# Patient Record
Sex: FEMALE | Race: WHITE | NOT HISPANIC OR LATINO | Employment: FULL TIME | URBAN - METROPOLITAN AREA
[De-identification: names, ages, dates, MRNs, and addresses within clinical notes are randomized per-mention and may not be internally consistent; named-entity substitution may affect disease eponyms.]

---

## 2023-03-08 DIAGNOSIS — E78.5 HYPERLIPIDEMIA, UNSPECIFIED HYPERLIPIDEMIA TYPE: Primary | ICD-10-CM

## 2023-03-08 RX ORDER — DENOSUMAB 60 MG/ML
INJECTION SUBCUTANEOUS
COMMUNITY
Start: 2020-12-22 | End: 2024-03-14 | Stop reason: WASHOUT

## 2023-03-08 RX ORDER — ATORVASTATIN CALCIUM 10 MG/1
10 TABLET, FILM COATED ORAL DAILY
Qty: 90 TABLET | Refills: 3 | Status: SHIPPED | OUTPATIENT
Start: 2023-03-08 | End: 2023-09-26 | Stop reason: ALTCHOICE

## 2023-03-08 RX ORDER — FLUOCINOLONE ACETONIDE 0.11 MG/ML
OIL TOPICAL
COMMUNITY
Start: 2021-06-18

## 2023-03-08 RX ORDER — LISINOPRIL 5 MG/1
1 TABLET ORAL DAILY
COMMUNITY
Start: 2017-08-04 | End: 2024-05-03 | Stop reason: SDUPTHER

## 2023-03-08 RX ORDER — MINERAL OIL
1 ENEMA (ML) RECTAL DAILY PRN
COMMUNITY

## 2023-03-08 RX ORDER — LEVOTHYROXINE SODIUM 100 UG/1
1 TABLET ORAL DAILY
COMMUNITY
Start: 2014-01-27 | End: 2023-05-17 | Stop reason: SDUPTHER

## 2023-03-08 RX ORDER — CARVEDILOL 3.12 MG/1
1 TABLET ORAL
COMMUNITY
Start: 2015-12-18 | End: 2023-07-20 | Stop reason: SDUPTHER

## 2023-03-08 RX ORDER — ATORVASTATIN CALCIUM 10 MG/1
1 TABLET, FILM COATED ORAL DAILY
COMMUNITY
Start: 2015-12-18 | End: 2023-03-08 | Stop reason: SDUPTHER

## 2023-03-29 LAB
ANION GAP IN SER/PLAS: 9 MMOL/L (ref 10–20)
CALCIUM (MG/DL) IN SER/PLAS: 9.3 MG/DL (ref 8.6–10.3)
CARBON DIOXIDE, TOTAL (MMOL/L) IN SER/PLAS: 31 MMOL/L (ref 21–32)
CHLORIDE (MMOL/L) IN SER/PLAS: 106 MMOL/L (ref 98–107)
CREATININE (MG/DL) IN SER/PLAS: 0.58 MG/DL (ref 0.5–1.05)
GFR FEMALE: >90 ML/MIN/1.73M2
GLUCOSE (MG/DL) IN SER/PLAS: 75 MG/DL (ref 74–99)
POTASSIUM (MMOL/L) IN SER/PLAS: 4.1 MMOL/L (ref 3.5–5.3)
SODIUM (MMOL/L) IN SER/PLAS: 142 MMOL/L (ref 136–145)
UREA NITROGEN (MG/DL) IN SER/PLAS: 14 MG/DL (ref 6–23)

## 2023-04-04 ENCOUNTER — OFFICE VISIT (OUTPATIENT)
Dept: PRIMARY CARE | Facility: CLINIC | Age: 68
End: 2023-04-04
Payer: COMMERCIAL

## 2023-04-04 VITALS
HEART RATE: 68 BPM | WEIGHT: 130.8 LBS | HEIGHT: 62 IN | DIASTOLIC BLOOD PRESSURE: 70 MMHG | BODY MASS INDEX: 24.07 KG/M2 | SYSTOLIC BLOOD PRESSURE: 106 MMHG | OXYGEN SATURATION: 97 %

## 2023-04-04 DIAGNOSIS — S76.012A MUSCLE STRAIN OF LEFT GLUTEAL REGION, INITIAL ENCOUNTER: Primary | ICD-10-CM

## 2023-04-04 PROCEDURE — 1036F TOBACCO NON-USER: CPT | Performed by: NURSE PRACTITIONER

## 2023-04-04 PROCEDURE — 1159F MED LIST DOCD IN RCRD: CPT | Performed by: NURSE PRACTITIONER

## 2023-04-04 PROCEDURE — 99213 OFFICE O/P EST LOW 20 MIN: CPT | Performed by: NURSE PRACTITIONER

## 2023-04-04 RX ORDER — TRIAMCINOLONE ACETONIDE 1 MG/G
CREAM TOPICAL
COMMUNITY
Start: 2023-04-01 | End: 2023-07-20 | Stop reason: SDUPTHER

## 2023-04-04 RX ORDER — FLUTICASONE PROPIONATE 50 MCG
2 SPRAY, SUSPENSION (ML) NASAL DAILY
COMMUNITY
Start: 2022-02-12

## 2023-04-04 ASSESSMENT — ENCOUNTER SYMPTOMS
CARDIOVASCULAR NEGATIVE: 1
BACK PAIN: 0
MYALGIAS: 1
RESPIRATORY NEGATIVE: 1
JOINT SWELLING: 0
ARTHRALGIAS: 0
GASTROINTESTINAL NEGATIVE: 1

## 2023-04-04 NOTE — PROGRESS NOTES
"Subjective   Patient ID: Annabel Bejarano is a 68 y.o. female who presents for Leg Pain (LLE x 1 week progressing).    HPI   Patient of Dr. aBrreto here today for acute concern. 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    Current Concern:  1) Left lower leg - specifically the left buttock pain for one week.  While doing yoga may have overstretched muscle.   Tried heat and stretches. Naproxen as needed. Interested in physical therapy    Chronic concern: Essential Hypertension, Hypothyroidism, Leukopenia, Lipid Disorder, Osteoporosis,   Specialist  Labs -03/29/2023      Review of Systems   Respiratory: Negative.     Cardiovascular: Negative.    Gastrointestinal: Negative.    Musculoskeletal:  Positive for myalgias. Negative for arthralgias, back pain, gait problem and joint swelling.       Objective   /70 (BP Location: Right arm, Patient Position: Sitting, BP Cuff Size: Adult)   Pulse 68   Ht 1.575 m (5' 2\")   Wt 59.3 kg (130 lb 12.8 oz)   SpO2 97%   BMI 23.92 kg/m²     Physical Exam  Vitals reviewed.   Constitutional:       Appearance: Normal appearance.   Cardiovascular:      Rate and Rhythm: Normal rate and regular rhythm.      Pulses: Normal pulses.      Heart sounds: Normal heart sounds.   Pulmonary:      Effort: Pulmonary effort is normal.      Breath sounds: Normal breath sounds.   Musculoskeletal:      Cervical back: Normal range of motion.      Right hip: Normal.      Left hip: No lacerations. Normal range of motion. Normal strength.      Right upper leg: Normal.      Left upper leg: No swelling, edema, tenderness or bony tenderness.      Left knee: Normal.      Right lower leg: Normal.      Left lower leg: Normal.      Comments: Straight leg lift- no deficits.    Skin:     General: Skin is warm.      Findings: No rash.   Neurological:      Mental Status: She is alert.      Gait: Gait is intact.      Deep Tendon Reflexes:      Reflex Scores:       " Bicep reflexes are 2+ on the right side and 2+ on the left side.       Patellar reflexes are 2+ on the right side and 2+ on the left side.  Psychiatric:         Mood and Affect: Mood normal.         Behavior: Behavior normal.         Judgment: Judgment normal.       Assessment/Plan   Diagnoses and all orders for this visit:  Muscle strain of left gluteal region, initial encounter  -     Referral to Physical Therapy; Future  Continue with conservative treatment Rest, ice or heat, NSAIDs.  If symptoms worsen please call back to office.   Plan/ Follow Up per scheduled with Dr. Barreto.

## 2023-05-17 DIAGNOSIS — E03.9 HYPOTHYROIDISM, UNSPECIFIED TYPE: Primary | ICD-10-CM

## 2023-05-17 RX ORDER — LEVOTHYROXINE SODIUM 100 UG/1
100 TABLET ORAL DAILY
Qty: 90 TABLET | Refills: 3 | Status: SHIPPED | OUTPATIENT
Start: 2023-05-17 | End: 2023-05-17

## 2023-07-20 DIAGNOSIS — I10 PRIMARY HYPERTENSION: ICD-10-CM

## 2023-07-20 DIAGNOSIS — L30.8 OTHER ECZEMA: Primary | ICD-10-CM

## 2023-07-20 RX ORDER — TRIAMCINOLONE ACETONIDE 1 MG/G
CREAM TOPICAL
Qty: 30 G | Refills: 3 | Status: SHIPPED | OUTPATIENT
Start: 2023-07-20

## 2023-07-20 RX ORDER — CARVEDILOL 3.12 MG/1
3.12 TABLET ORAL
Qty: 180 TABLET | Refills: 0 | Status: SHIPPED | OUTPATIENT
Start: 2023-07-20 | End: 2023-10-16

## 2023-07-31 ENCOUNTER — TELEPHONE (OUTPATIENT)
Dept: PEDIATRICS | Facility: CLINIC | Age: 68
End: 2023-07-31
Payer: COMMERCIAL

## 2023-07-31 DIAGNOSIS — L73.9 FOLLICULITIS: Primary | ICD-10-CM

## 2023-07-31 RX ORDER — DOXYCYCLINE HYCLATE 100 MG
100 TABLET ORAL 2 TIMES DAILY
Qty: 30 TABLET | Refills: 0 | Status: SHIPPED | OUTPATIENT
Start: 2023-07-31 | End: 2023-08-14

## 2023-08-09 LAB — CALCIUM (MG/DL) IN SER/PLAS: 9.4 MG/DL (ref 8.6–10.6)

## 2023-09-26 PROBLEM — H43.813 VITREOUS DEGENERATION OF BOTH EYES: Status: ACTIVE | Noted: 2023-09-26

## 2023-09-26 PROBLEM — H43.813 PVD (POSTERIOR VITREOUS DETACHMENT), BOTH EYES: Status: ACTIVE | Noted: 2023-09-26

## 2023-09-26 PROBLEM — E03.9 HYPOTHYROIDISM: Status: ACTIVE | Noted: 2023-09-26

## 2023-09-26 PROBLEM — S05.8X9A: Status: ACTIVE | Noted: 2023-09-26

## 2023-09-26 PROBLEM — M54.16 LEFT LUMBAR RADICULOPATHY: Status: ACTIVE | Noted: 2023-09-26

## 2023-09-26 PROBLEM — I77.810 ASCENDING AORTA DILATATION (CMS-HCC): Status: ACTIVE | Noted: 2023-09-26

## 2023-09-26 PROBLEM — I34.0 NON-RHEUMATIC MITRAL REGURGITATION: Status: ACTIVE | Noted: 2023-09-26

## 2023-09-26 PROBLEM — H18.529 ABMD (ANTERIOR BASEMENT MEMBRANE DYSTROPHY): Status: ACTIVE | Noted: 2023-09-26

## 2023-09-26 PROBLEM — H04.123 BILATERAL DRY EYES: Status: ACTIVE | Noted: 2023-09-26

## 2023-09-26 PROBLEM — H01.003 BLEPHARITIS, BOTH EYES: Status: ACTIVE | Noted: 2023-09-26

## 2023-09-26 PROBLEM — E78.9 LIPID DISORDER: Status: ACTIVE | Noted: 2023-09-26

## 2023-09-26 PROBLEM — D72.819 LEUKOPENIA: Status: ACTIVE | Noted: 2023-09-26

## 2023-09-26 PROBLEM — Z96.1 PSEUDOPHAKIA OF BOTH EYES: Status: ACTIVE | Noted: 2023-09-26

## 2023-09-26 PROBLEM — H02.051: Status: ACTIVE | Noted: 2023-09-26

## 2023-09-26 PROBLEM — I10 ESSENTIAL HYPERTENSION: Status: ACTIVE | Noted: 2023-09-26

## 2023-09-26 PROBLEM — K64.5 HEMORRHOIDS, INTERNAL, THROMBOSED: Status: ACTIVE | Noted: 2023-09-26

## 2023-09-26 PROBLEM — H25.11 AGE-RELATED NUCLEAR CATARACT OF RIGHT EYE: Status: ACTIVE | Noted: 2023-09-26

## 2023-09-26 PROBLEM — I34.1 MVP (MITRAL VALVE PROLAPSE): Status: ACTIVE | Noted: 2023-09-26

## 2023-09-26 PROBLEM — N95.2 ATROPHIC VAGINITIS: Status: ACTIVE | Noted: 2023-09-26

## 2023-09-26 PROBLEM — M79.18 LEFT BUTTOCK PAIN: Status: ACTIVE | Noted: 2023-09-26

## 2023-09-26 PROBLEM — M81.0 OSTEOPOROSIS: Status: ACTIVE | Noted: 2023-09-26

## 2023-09-26 PROBLEM — H01.006 BLEPHARITIS, BOTH EYES: Status: ACTIVE | Noted: 2023-09-26

## 2023-09-26 PROBLEM — J31.0 RHINITIS: Status: ACTIVE | Noted: 2023-09-26

## 2023-09-26 PROBLEM — H25.12 AGE-RELATED NUCLEAR CATARACT OF LEFT EYE: Status: ACTIVE | Noted: 2023-09-26

## 2023-09-26 PROBLEM — R94.31 ABNORMAL EKG: Status: ACTIVE | Noted: 2023-09-26

## 2023-09-26 PROBLEM — M85.80 OSTEOPENIA: Status: ACTIVE | Noted: 2023-09-26

## 2023-09-26 PROBLEM — H52.10 MYOPIA: Status: ACTIVE | Noted: 2023-09-26

## 2023-09-26 PROBLEM — L73.9 FOLLICULITIS: Status: ACTIVE | Noted: 2023-09-26

## 2023-09-26 PROBLEM — D48.5 NEOPLASM OF UNCERTAIN BEHAVIOR OF SKIN: Status: ACTIVE | Noted: 2018-11-14

## 2023-09-26 PROBLEM — L30.9 ECZEMA: Status: ACTIVE | Noted: 2018-11-14

## 2023-09-26 PROBLEM — R92.8 ABNORMAL SCREENING MAMMOGRAM: Status: ACTIVE | Noted: 2023-09-26

## 2023-09-26 PROBLEM — M25.50 ARTHRALGIA: Status: ACTIVE | Noted: 2023-09-26

## 2023-09-26 PROBLEM — I35.1 MODERATE AORTIC REGURGITATION: Status: ACTIVE | Noted: 2023-09-26

## 2023-09-26 PROBLEM — D75.89 MACROCYTOSIS: Status: ACTIVE | Noted: 2023-09-26

## 2023-09-26 PROBLEM — J30.9 ALLERGIC RHINITIS: Status: ACTIVE | Noted: 2023-09-26

## 2023-09-26 PROBLEM — H17.11 CENTRAL CORNEAL OPACITY OF RIGHT EYE: Status: ACTIVE | Noted: 2023-09-26

## 2023-09-26 PROBLEM — L57.0 ACTINIC KERATOSIS: Status: ACTIVE | Noted: 2018-11-14

## 2023-09-26 PROBLEM — L90.0 LICHEN SCLEROSUS ET ATROPHICUS: Status: ACTIVE | Noted: 2023-09-26

## 2023-09-26 PROBLEM — H26.493 PCO (POSTERIOR CAPSULAR OPACIFICATION), BILATERAL: Status: ACTIVE | Noted: 2023-09-26

## 2023-09-26 PROBLEM — L98.9 SKIN LESION: Status: ACTIVE | Noted: 2023-09-26

## 2023-09-26 RX ORDER — ATORVASTATIN CALCIUM 20 MG/1
20 TABLET, FILM COATED ORAL DAILY
COMMUNITY
Start: 2023-05-26 | End: 2024-02-06 | Stop reason: SDUPTHER

## 2023-09-27 ENCOUNTER — OFFICE VISIT (OUTPATIENT)
Dept: PRIMARY CARE | Facility: CLINIC | Age: 68
End: 2023-09-27
Payer: COMMERCIAL

## 2023-09-27 VITALS
HEIGHT: 62 IN | TEMPERATURE: 97.1 F | HEART RATE: 70 BPM | WEIGHT: 126 LBS | DIASTOLIC BLOOD PRESSURE: 70 MMHG | SYSTOLIC BLOOD PRESSURE: 118 MMHG | BODY MASS INDEX: 23.19 KG/M2 | OXYGEN SATURATION: 98 %

## 2023-09-27 DIAGNOSIS — E03.9 HYPOTHYROIDISM, UNSPECIFIED TYPE: Primary | ICD-10-CM

## 2023-09-27 DIAGNOSIS — H91.90 HEARING LOSS, UNSPECIFIED HEARING LOSS TYPE, UNSPECIFIED LATERALITY: ICD-10-CM

## 2023-09-27 DIAGNOSIS — Z12.11 COLON CANCER SCREENING: ICD-10-CM

## 2023-09-27 DIAGNOSIS — I10 ESSENTIAL HYPERTENSION: ICD-10-CM

## 2023-09-27 DIAGNOSIS — Z00.00 WELLNESS EXAMINATION: ICD-10-CM

## 2023-09-27 DIAGNOSIS — M81.0 OSTEOPOROSIS, UNSPECIFIED OSTEOPOROSIS TYPE, UNSPECIFIED PATHOLOGICAL FRACTURE PRESENCE: ICD-10-CM

## 2023-09-27 DIAGNOSIS — M25.559 HIP PAIN, UNSPECIFIED LATERALITY: ICD-10-CM

## 2023-09-27 DIAGNOSIS — M54.9 BACK PAIN, UNSPECIFIED BACK LOCATION, UNSPECIFIED BACK PAIN LATERALITY, UNSPECIFIED CHRONICITY: ICD-10-CM

## 2023-09-27 PROCEDURE — 3078F DIAST BP <80 MM HG: CPT | Performed by: INTERNAL MEDICINE

## 2023-09-27 PROCEDURE — 1159F MED LIST DOCD IN RCRD: CPT | Performed by: INTERNAL MEDICINE

## 2023-09-27 PROCEDURE — 1126F AMNT PAIN NOTED NONE PRSNT: CPT | Performed by: INTERNAL MEDICINE

## 2023-09-27 PROCEDURE — 3074F SYST BP LT 130 MM HG: CPT | Performed by: INTERNAL MEDICINE

## 2023-09-27 PROCEDURE — 99397 PER PM REEVAL EST PAT 65+ YR: CPT | Performed by: INTERNAL MEDICINE

## 2023-09-27 PROCEDURE — 1036F TOBACCO NON-USER: CPT | Performed by: INTERNAL MEDICINE

## 2023-09-27 NOTE — PROGRESS NOTES
"Subjective   Patient ID: Annabel Bejarano is a 68 y.o. female who presents for Annual Exam.    HPI   Wellness exam  Feels generally well  Some stress w/ work  Pain for a number of weeks right sacroiliac region. No groin pain per se  Some mild decreased hearing    Review of Systems  Sees eye doctor twice yearly. Stable  Retiring next year    Objective   /70   Pulse 70   Temp 36.2 °C (97.1 °F)   Ht 1.575 m (5' 2\")   Wt 57.2 kg (126 lb)   SpO2 98%   BMI 23.05 kg/m²     Physical Exam  Gen nad, affect wnl  Heentt eomfg, face symmetric, ncat  Neck w/o la, tm, bruit  Lungs clear   Cv rrr nl s1, s2  Ext w/o edema  Neuro grossly nonfocal  Skin good color  Abd soft, nt, w/o hsm  Rom r hip normal w/o pain    Assessment/Plan   Diagnoses and all orders for this visit:  Hypothyroidism, unspecified type  Essential hypertension  Osteoporosis, unspecified osteoporosis type, unspecified pathological fracture presence  Hearing loss, unspecified hearing loss type, unspecified laterality  -     Referral to Audiology; Future  Back pain, unspecified back location, unspecified back pain laterality, unspecified chronicity  -     XR lumbar spine 2-3 views; Future  -     XR sacroiliac joints 3+ views; Future  -     XR hip right 2 or 3 views; Future  -     Urinalysis with Reflex Microscopic; Future  -     Referral to Physical Therapy; Future  Hip pain, unspecified laterality  -     XR lumbar spine 2-3 views; Future  -     XR sacroiliac joints 3+ views; Future  -     XR hip right 2 or 3 views; Future  -     Referral to Physical Therapy; Future  Colon cancer screening  -     Colonoscopy Screening; Future  Wellness examination  -     Urinalysis with Reflex Microscopic; Future  -     Comprehensive metabolic panel; Future  -     Lipid Panel; Future  -     Tsh With Reflex To Free T4 If Abnormal; Future  -     CBC and Auto Differential; Future  -     Vitamin D 25-Hydroxy,Total (for eval of Vitamin D levels); Future     " Audiology  Colonoscopy  Labs  Xrays  Phys therapy  If results ok and all goes well, follow up in a year

## 2023-09-29 ENCOUNTER — LAB (OUTPATIENT)
Dept: LAB | Facility: LAB | Age: 68
End: 2023-09-29
Payer: COMMERCIAL

## 2023-09-29 DIAGNOSIS — Z00.00 WELLNESS EXAMINATION: ICD-10-CM

## 2023-09-29 DIAGNOSIS — M54.9 BACK PAIN, UNSPECIFIED BACK LOCATION, UNSPECIFIED BACK PAIN LATERALITY, UNSPECIFIED CHRONICITY: ICD-10-CM

## 2023-09-29 LAB
ALANINE AMINOTRANSFERASE (SGPT) (U/L) IN SER/PLAS: 7 U/L (ref 7–45)
ALBUMIN (G/DL) IN SER/PLAS: 4.1 G/DL (ref 3.4–5)
ALKALINE PHOSPHATASE (U/L) IN SER/PLAS: 64 U/L (ref 33–136)
ANION GAP IN SER/PLAS: 14 MMOL/L (ref 10–20)
APPEARANCE, URINE: CLEAR
ASPARTATE AMINOTRANSFERASE (SGOT) (U/L) IN SER/PLAS: 21 U/L (ref 9–39)
BASOPHILS (10*3/UL) IN BLOOD BY AUTOMATED COUNT: 0.03 X10E9/L (ref 0–0.1)
BASOPHILS/100 LEUKOCYTES IN BLOOD BY AUTOMATED COUNT: 0.6 % (ref 0–2)
BILIRUBIN TOTAL (MG/DL) IN SER/PLAS: 0.5 MG/DL (ref 0–1.2)
BILIRUBIN, URINE: NEGATIVE
BLOOD, URINE: NEGATIVE
CALCIDIOL (25 OH VITAMIN D3) (NG/ML) IN SER/PLAS: 33 NG/ML
CALCIUM (MG/DL) IN SER/PLAS: 9.4 MG/DL (ref 8.6–10.6)
CARBON DIOXIDE, TOTAL (MMOL/L) IN SER/PLAS: 29 MMOL/L (ref 21–32)
CHLORIDE (MMOL/L) IN SER/PLAS: 104 MMOL/L (ref 98–107)
CHOLESTEROL (MG/DL) IN SER/PLAS: 151 MG/DL (ref 0–199)
CHOLESTEROL IN HDL (MG/DL) IN SER/PLAS: 69.9 MG/DL
CHOLESTEROL/HDL RATIO: 2.2
COLOR, URINE: ABNORMAL
CREATININE (MG/DL) IN SER/PLAS: 0.61 MG/DL (ref 0.5–1.05)
EOSINOPHILS (10*3/UL) IN BLOOD BY AUTOMATED COUNT: 0.27 X10E9/L (ref 0–0.7)
EOSINOPHILS/100 LEUKOCYTES IN BLOOD BY AUTOMATED COUNT: 5.6 % (ref 0–6)
ERYTHROCYTE DISTRIBUTION WIDTH (RATIO) BY AUTOMATED COUNT: 13.2 % (ref 11.5–14.5)
ERYTHROCYTE MEAN CORPUSCULAR HEMOGLOBIN CONCENTRATION (G/DL) BY AUTOMATED: 31.2 G/DL (ref 32–36)
ERYTHROCYTE MEAN CORPUSCULAR VOLUME (FL) BY AUTOMATED COUNT: 101 FL (ref 80–100)
ERYTHROCYTES (10*6/UL) IN BLOOD BY AUTOMATED COUNT: 4.09 X10E12/L (ref 4–5.2)
GFR FEMALE: >90 ML/MIN/1.73M2
GLUCOSE (MG/DL) IN SER/PLAS: 89 MG/DL (ref 74–99)
GLUCOSE, URINE: NEGATIVE MG/DL
HEMATOCRIT (%) IN BLOOD BY AUTOMATED COUNT: 41.3 % (ref 36–46)
HEMOGLOBIN (G/DL) IN BLOOD: 12.9 G/DL (ref 12–16)
IMMATURE GRANULOCYTES/100 LEUKOCYTES IN BLOOD BY AUTOMATED COUNT: 0.2 % (ref 0–0.9)
KETONES, URINE: NEGATIVE MG/DL
LDL: 67 MG/DL (ref 0–99)
LEUKOCYTE ESTERASE, URINE: ABNORMAL
LEUKOCYTES (10*3/UL) IN BLOOD BY AUTOMATED COUNT: 4.9 X10E9/L (ref 4.4–11.3)
LYMPHOCYTES (10*3/UL) IN BLOOD BY AUTOMATED COUNT: 1.53 X10E9/L (ref 1.2–4.8)
LYMPHOCYTES/100 LEUKOCYTES IN BLOOD BY AUTOMATED COUNT: 31.5 % (ref 13–44)
MONOCYTES (10*3/UL) IN BLOOD BY AUTOMATED COUNT: 0.51 X10E9/L (ref 0.1–1)
MONOCYTES/100 LEUKOCYTES IN BLOOD BY AUTOMATED COUNT: 10.5 % (ref 2–10)
MUCUS, URINE: ABNORMAL /LPF
NEUTROPHILS (10*3/UL) IN BLOOD BY AUTOMATED COUNT: 2.5 X10E9/L (ref 1.2–7.7)
NEUTROPHILS/100 LEUKOCYTES IN BLOOD BY AUTOMATED COUNT: 51.6 % (ref 40–80)
NITRITE, URINE: NEGATIVE
NRBC (PER 100 WBCS) BY AUTOMATED COUNT: 0 /100 WBC (ref 0–0)
PH, URINE: 7 (ref 5–8)
PLATELETS (10*3/UL) IN BLOOD AUTOMATED COUNT: 272 X10E9/L (ref 150–450)
POTASSIUM (MMOL/L) IN SER/PLAS: 4.6 MMOL/L (ref 3.5–5.3)
PROTEIN TOTAL: 6.7 G/DL (ref 6.4–8.2)
PROTEIN, URINE: NEGATIVE MG/DL
RBC, URINE: 1 /HPF (ref 0–5)
RENAL EPITHELIAL CELLS, URINE: <1 /HPF
SODIUM (MMOL/L) IN SER/PLAS: 142 MMOL/L (ref 136–145)
SPECIFIC GRAVITY, URINE: 1.01 (ref 1–1.03)
SQUAMOUS EPITHELIAL CELLS, URINE: <1 /HPF
THYROTROPIN (MIU/L) IN SER/PLAS BY DETECTION LIMIT <= 0.05 MIU/L: 0.29 MIU/L (ref 0.44–3.98)
THYROXINE (T4) FREE (NG/DL) IN SER/PLAS: 1.26 NG/DL (ref 0.78–1.48)
TRIGLYCERIDE (MG/DL) IN SER/PLAS: 69 MG/DL (ref 0–149)
UREA NITROGEN (MG/DL) IN SER/PLAS: 14 MG/DL (ref 6–23)
UROBILINOGEN, URINE: <2 MG/DL (ref 0–1.9)
VLDL: 14 MG/DL (ref 0–40)
WBC, URINE: 7 /HPF (ref 0–5)

## 2023-09-29 PROCEDURE — 84439 ASSAY OF FREE THYROXINE: CPT

## 2023-09-29 PROCEDURE — 85025 COMPLETE CBC W/AUTO DIFF WBC: CPT

## 2023-09-29 PROCEDURE — 80061 LIPID PANEL: CPT

## 2023-09-29 PROCEDURE — 84443 ASSAY THYROID STIM HORMONE: CPT

## 2023-09-29 PROCEDURE — 80053 COMPREHEN METABOLIC PANEL: CPT

## 2023-09-29 PROCEDURE — 81001 URINALYSIS AUTO W/SCOPE: CPT

## 2023-09-29 PROCEDURE — 82306 VITAMIN D 25 HYDROXY: CPT

## 2023-09-29 PROCEDURE — 36415 COLL VENOUS BLD VENIPUNCTURE: CPT

## 2023-10-14 DIAGNOSIS — I10 PRIMARY HYPERTENSION: ICD-10-CM

## 2023-10-14 DIAGNOSIS — L30.9 DERMATITIS: Primary | ICD-10-CM

## 2023-10-16 RX ORDER — FLUOCINOLONE ACETONIDE 0.11 MG/ML
OIL TOPICAL
Qty: 118.28 ML | Refills: 4 | Status: SHIPPED | OUTPATIENT
Start: 2023-10-16

## 2023-10-16 RX ORDER — CARVEDILOL 3.12 MG/1
3.12 TABLET ORAL
Qty: 180 TABLET | Refills: 0 | Status: SHIPPED | OUTPATIENT
Start: 2023-10-16 | End: 2024-01-15

## 2023-11-14 DIAGNOSIS — E03.9 HYPOTHYROIDISM, UNSPECIFIED TYPE: ICD-10-CM

## 2023-11-14 RX ORDER — LEVOTHYROXINE SODIUM 100 UG/1
100 TABLET ORAL DAILY
Qty: 90 TABLET | Refills: 3 | Status: SHIPPED | OUTPATIENT
Start: 2023-11-14 | End: 2024-11-13

## 2023-11-17 ENCOUNTER — CLINICAL SUPPORT (OUTPATIENT)
Dept: AUDIOLOGY | Facility: CLINIC | Age: 68
End: 2023-11-17
Payer: COMMERCIAL

## 2023-11-17 DIAGNOSIS — H90.41 SENSORINEURAL HEARING LOSS (SNHL) OF RIGHT EAR WITH UNRESTRICTED HEARING OF LEFT EAR: Primary | ICD-10-CM

## 2023-11-17 DIAGNOSIS — H91.90 HEARING LOSS, UNSPECIFIED HEARING LOSS TYPE, UNSPECIFIED LATERALITY: ICD-10-CM

## 2023-11-17 PROCEDURE — 92567 TYMPANOMETRY: CPT

## 2023-11-17 PROCEDURE — 92557 COMPREHENSIVE HEARING TEST: CPT

## 2023-11-17 ASSESSMENT — ENCOUNTER SYMPTOMS: OCCASIONAL FEELINGS OF UNSTEADINESS: 0

## 2023-11-17 NOTE — PROGRESS NOTES
AUDIOLOGY ADULT AUDIOMETRIC EVALUATION      Name:  Annabel Bejarano  :  1955  Age:  68 y.o.  Date of Evaluation:  2023    IMPRESSIONS     Today's test results are consistent with normal hearing sensitivity in the LE and normal hearing sensitivity steeply sloping to a moderately-severe SNHL at 8000 Hz in the RE. Discussed results and recommendations with patient.  Questions were addressed and the patient was encouraged to contact our department should concerns arise.    RECOMMENDATIONS     Given asymmetrical component at 8000 Hz, recommended to continue medical follow up with PCP/ENT. Patient agreed to this plan.  Return for annual hearing evaluation or sooner should concerns arise.    Time: 1493-7411    HISTORY     Reason for visit:  Annabel Bejarano is seen today at the request of Shukri Barreto MD for an evaluation of hearing.  Patient complains of difficulties understanding others in crowds or her  within the home. She notes he is a soft speaker at times. She also reported intermittent aural fullness which is resolved with Flonase. Patient denied history of tinnitus, dizziness, or excessive noise exposure. No history of hearing aid use.    TEST RESULTS     Otoscopic Evaluation:  Right Ear: Clear ear canal.  Left Ear: Clear ear canal.    Tympanometry & Acoustic Reflexes:  Right Ear: Middle ear pressure and eardrm mobility within defined limits. Unable to perform Acoustic Reflex Testing due to inability to maintain a hermetic seal.   Left Ear: Middle ear pressure and eardrm mobility within defined limits. Unable to perform Acoustic Reflex Testing due to inability to maintain a hermetic seal.     Pure Tone Audiometry:    Right Ear: Hearing sensitivity WNL from 125 Hz - 6000 Hz, steeply sloping to a moderately-severe SNHL at 8000 Hz only.  Left Ear: Hearing sensitivity WNL.     Speech Audiometry:   Right Ear:  Speech Reception Threshold (SRT) was obtained at 5 dBHL. Word Recognition scores  were excellent (100%) in quiet when words were presented at 55 dBHL.  Left Ear:  Speech Reception Threshold (SRT) was obtained at 5 dBHL. Word Recognition scores were excellent (100%) in quiet when words were presented at 55 dBHL.    Distortion Product Otoacoustic Emissions:  DNT.    Testing and interpretation of results completed LEONELA Amaral, CCC-A. It was my pleasure to evaluate this patient.       LEONELA Amaral, CCC-A  Licensed Audiologist      Degree of Hearing Sensitivity Decibel Range   Within Normal Limits (WNL) 0-25   Mild 26-40   Moderate 41-55   Moderately-Severe 56-70   Severe 71-90   Profound 91+      Key   CNT/DNT Could Not Test/Did Not Test   TM Tympanic Membrane   WNL Within Normal Limits   HA Hearing Aid   SNHL Sensorineural Hearing Loss   CHL Conductive Hearing Loss   NIHL Noise-Induced Hearing Loss   ECV Ear Canal Volume   RE/LE Right Ear/Left Ear        AUDIOGRAM

## 2023-12-01 ENCOUNTER — PHARMACY VISIT (OUTPATIENT)
Dept: PHARMACY | Facility: CLINIC | Age: 68
End: 2023-12-01
Payer: COMMERCIAL

## 2023-12-01 PROCEDURE — RXMED WILLOW AMBULATORY MEDICATION CHARGE

## 2023-12-05 ENCOUNTER — TELEPHONE (OUTPATIENT)
Dept: OBSTETRICS AND GYNECOLOGY | Facility: CLINIC | Age: 68
End: 2023-12-05
Payer: COMMERCIAL

## 2023-12-05 NOTE — TELEPHONE ENCOUNTER
Patient called stating that she is out of refills on her Prolia.     Specialty Pharmacy    March 5th is her check - up

## 2023-12-06 DIAGNOSIS — M81.0 AGE-RELATED OSTEOPOROSIS WITHOUT CURRENT PATHOLOGICAL FRACTURE: Primary | ICD-10-CM

## 2023-12-08 ENCOUNTER — ANCILLARY PROCEDURE (OUTPATIENT)
Dept: RADIOLOGY | Facility: CLINIC | Age: 68
End: 2023-12-08
Payer: COMMERCIAL

## 2023-12-08 VITALS — HEIGHT: 62 IN | BODY MASS INDEX: 23.21 KG/M2 | WEIGHT: 126.1 LBS

## 2023-12-08 PROCEDURE — 77067 SCR MAMMO BI INCL CAD: CPT | Mod: BILATERAL PROCEDURE | Performed by: RADIOLOGY

## 2023-12-08 PROCEDURE — 77063 BREAST TOMOSYNTHESIS BI: CPT | Mod: BILATERAL PROCEDURE | Performed by: RADIOLOGY

## 2023-12-08 PROCEDURE — 77063 BREAST TOMOSYNTHESIS BI: CPT

## 2023-12-12 ENCOUNTER — EVALUATION (OUTPATIENT)
Dept: PHYSICAL THERAPY | Facility: CLINIC | Age: 68
End: 2023-12-12
Payer: COMMERCIAL

## 2023-12-12 DIAGNOSIS — M54.9 BACK PAIN, UNSPECIFIED BACK LOCATION, UNSPECIFIED BACK PAIN LATERALITY, UNSPECIFIED CHRONICITY: Primary | ICD-10-CM

## 2023-12-12 DIAGNOSIS — M25.559 HIP PAIN, UNSPECIFIED LATERALITY: ICD-10-CM

## 2023-12-12 PROCEDURE — 97110 THERAPEUTIC EXERCISES: CPT | Mod: GP | Performed by: PHYSICAL THERAPIST

## 2023-12-12 PROCEDURE — 97161 PT EVAL LOW COMPLEX 20 MIN: CPT | Mod: GP | Performed by: PHYSICAL THERAPIST

## 2023-12-12 ASSESSMENT — ENCOUNTER SYMPTOMS
LOSS OF SENSATION IN FEET: 0
DEPRESSION: 0
OCCASIONAL FEELINGS OF UNSTEADINESS: 0

## 2023-12-12 ASSESSMENT — PATIENT HEALTH QUESTIONNAIRE - PHQ9
SUM OF ALL RESPONSES TO PHQ9 QUESTIONS 1 AND 2: 0
1. LITTLE INTEREST OR PLEASURE IN DOING THINGS: NOT AT ALL
2. FEELING DOWN, DEPRESSED OR HOPELESS: NOT AT ALL

## 2023-12-12 ASSESSMENT — PAIN SCALES - GENERAL: PAINLEVEL_OUTOF10: 2

## 2023-12-12 NOTE — PROGRESS NOTES
Physical Therapy    Physical Therapy Lumbar/LE Evaluation    Patient Name: Annabel Bejarano  MRN: [id number]  Today's Date: 12/12/2023  Time Calculation  Start Time: 0745  Stop Time: 0829  Time Calculation (min): 44 min    Current Problem  Problem List Items Addressed This Visit             ICD-10-CM    Back pain - Primary M54.9    Relevant Orders    Follow Up In Physical Therapy    Hip pain M25.559    Relevant Orders    Follow Up In Physical Therapy          Precautions  Precautions  Precautions Comment: None       Pain  Pain Score: 2  Pain at worst: 6/10  Location of pain: R hip that wraps laterally    SUBJECTIVE:   Chief complaint:  She reports that R hip pain, SI pain began about 3-4 mo ago.   Came about after doing yard work   Intermittent   Achy     PT note from 4/4/23:   Mechanism of Injury:. L LE symptoms in L buttock x 1 week. (3/28/23)  She overstretched doing Frederick stretch in yoga  She notes it also bothers her while touching toes  Symptoms radiate into lateral aspect of LLE into foot   She reports throbbing symptoms.   Denies N/T  Denies weakness   LBP every now and then   Also, the weekend prior to onset she was weeding, pushing wheel burrow. Questions if this added to her symptoms  Sometimes strain produces symptoms.     -N/T  -B/B  -Cough/sneeze/strain    Imaging:    FINDINGS:   There are  5 lumbar type vertebral bodies. There is grade 1   anterolisthesis of L3 on L4-L4 on L5 and L5 on S1. Vertebral body   height and alignment  are otherwise maintained. No fracture or   dislocation is evident.  There is severe L4-5 and L5-S1 and moderate   L3-4 facet degenerative change. There is mild-to-moderate L5-S1   discogenic degenerative change. Mild degenerative changes seen at   other levels of the visualized spine. Vascular calcifications are   seen over the soft tissues.     Multilevel spondylolisthesis and degenerative change of the spine as   above without osseous injury evident.     Aggravating  factors:  Rising from seated position, external rotation    Alleviating factors:  Laying in bed     Prior level of function:  Previously independent with all functional activity    Functional limitations:  Yard work, rising from seated position     Work:  Pediatrician. Part time     Patients goal:  Decrease pain     Prior tx:  PT earlier in the year     Objective:    Lower Extremity Strength: (WNL unless documented below)   MMT 5/5 max  RIGHT LEFT   Hip Flexion 5 5 reported R LBP   Hip Extension 4+ 4+   Hip Abduction 4+ pain SI  4+   Hip Adduction 4+ pain SI 4+   Hip IR  4 4   Hip ER 4+ 4+     Lower Extremity ROM: (WNL unless documented below)   ROM in Degrees  RIGHT LEFT   Hip Flexion     Hip Extension     Hip Abduction     Hip Adduction      Hip ER     Hip IR       Lower Extremity Flexibility: (WNL unless documented below)   Flexibility  RIGHT LEFT   Quad 9 in  8 in    Hamstring  WNL  WNL    Hip flexor  Min loss Min loss    Piriformis  Min loss  Min loss      Lumbar ROM:   Flexion WNL, mild pain R    Extension Min loss, pain 2/10    RIGHT LEFT   Side Bend Min loss, NE  Min loss, mild R pain      Patty Lumbar repeated movements:   Pretest Symptoms:    RFIS    ADAM Increase, 3/10   REIL  Decrease, better       Special tests: (WNL unless documented below)    RIGHT LEFT   Slump  - -   SLR - -   Dong + mild pain  -   SI tests  +thrust, -compression, gap      Myotomes: (WNL unless documented below)     Dermatomes: (WNL unless documented below)     Palpation: Non tender to palpation over SI     Outcome Measure:  Other Measures  Oswestry Disablity Index (VALARIE): 4%      TREATMENT:  Initial evaluation completed. Issued and reviewed HEP with pt that included:  Access Code: [email or web address]  URL: [email or web address]  Date: 12/12/2023  Prepared by: Isa Golden    Exercises  - Prone Press Up  - 7 x weekly - 10 reps - every 3-4 hours frequency   - Clamshell with Resistance  - 1 x daily - 7 x weekly - 2-3  sets - 10 reps  - Sidelying Reverse Clamshell with Resistance  - 1 x daily - 7 x weekly - 2-3 sets - 10 reps  - Supine Bridge with Resistance Band  - 1 x daily - 7 x weekly - 2-3 sets - 10 reps  - Prone Alternating Arm and Leg Lifts  - 1 x daily - 7 x weekly - 2-3 sets - 10 reps  - Supine Pelvic Tilt with Straight Leg Raise  - 1 x daily - 7 x weekly - 2 sets - 10 reps  - Seated Figure 4 Piriformis Stretch  - 1 x daily - 7 x weekly - 2 reps - 30 seconds hold  - Modified Zac Stretch  - 1 x daily - 7 x weekly - 2 reps - 30 seconds hold    ASSESSEMENT  The pt presents with signs and symptoms consistent with the Physical Therapy diagnosis of R LBP with possible SIJ involvement. She has pain that radiates laterally. Began anout 3-4 months ago after doing yard work.   Pt demonstrates decreased bilateral hip strength with some pain reported on R. 1/5 SI joint tests were positive today on the R. Relief with REIL. Good flexibility with some tightness in hip flexors. Pt highly motivated and will benefit from skilled physical therapy to reduce impairments in order to return to prior level of function, reduce pain, increase strength and ROM and improve overall posture.   The physical therapy prognosis is good for the patient to achieve their goals.   The pt tolerated therapy treatment today well with no adverse effects.  Barriers to therapy include:  None     PLAN  The pt will be seen 1 time(s) a week for 3-4 weeks.      The pt has been educated about the risks and benefits of physical therapy including manual therapy treatments and gives consent for treatment.     The patient will benefit from physical therapy treatment to include: therapeutic exercises, therapeutic activities, neurological re-education, manual therapy, modalities, and a home exercise program.       Goals:  Active       PT Problem       Reduce pain at worst to 2/10 with all functional and recreational activity.        Start:  12/12/23    Expected End:   03/11/24            Increase ROM/flexibility to WFL to perform daily functional activities       Start:  12/12/23    Expected End:  03/11/24            Increase by > or = 1/2 mm grade to improve stability and body mechanics to perform daily activities without increased pain/compensation         Start:  12/12/23    Expected End:  03/11/24            Pt to have decrease in Oswestry by 10% to display increased overall function.        Start:  12/12/23    Expected End:  03/11/24            Patient will demonstrate independence in home program for support of progression       Start:  12/12/23    Expected End:  03/11/24

## 2023-12-19 ENCOUNTER — APPOINTMENT (OUTPATIENT)
Dept: PHYSICAL THERAPY | Facility: CLINIC | Age: 68
End: 2023-12-19
Payer: COMMERCIAL

## 2024-01-09 ENCOUNTER — TREATMENT (OUTPATIENT)
Dept: PHYSICAL THERAPY | Facility: CLINIC | Age: 69
End: 2024-01-09
Payer: COMMERCIAL

## 2024-01-09 DIAGNOSIS — M54.9 BACK PAIN, UNSPECIFIED BACK LOCATION, UNSPECIFIED BACK PAIN LATERALITY, UNSPECIFIED CHRONICITY: Primary | ICD-10-CM

## 2024-01-09 DIAGNOSIS — M25.562 LEFT KNEE PAIN: ICD-10-CM

## 2024-01-09 DIAGNOSIS — M25.559 HIP PAIN, UNSPECIFIED LATERALITY: ICD-10-CM

## 2024-01-09 PROCEDURE — 97110 THERAPEUTIC EXERCISES: CPT | Mod: GP,59 | Performed by: PHYSICAL THERAPIST

## 2024-01-09 PROCEDURE — 97164 PT RE-EVAL EST PLAN CARE: CPT | Mod: GP,59 | Performed by: PHYSICAL THERAPIST

## 2024-01-09 ASSESSMENT — PAIN SCALES - GENERAL: PAINLEVEL_OUTOF10: 0 - NO PAIN

## 2024-01-09 NOTE — PROGRESS NOTES
Physical Therapy Treatment    Patient Name: Annabel Bejarano  MRN: [id number]  Today's Date: 1/9/2024  Time Calculation  Start Time: 0749  Stop Time: 0839  Time Calculation (min): 50 min    Current Problem:  Problem List Items Addressed This Visit             ICD-10-CM    Back pain - Primary M54.9    Hip pain M25.559    Left knee pain M25.562       Subjective   General:   Pt reports that she does correlate her pain with outdoor activity. She notes feeling better since   She reports she went ice skating over the weekend and fell on her L knee. She caught her toe pick on the ice. States she fell directly onto to knee (medial patella region). She had significant bruising and some swelling. Denies pain with twisting.   Denies clicking/popping     Pain going up and down steps     Pain:  Pain Score: 0 - No pain in back.   Pain location: L knee pain. Pain at worst: 8-9/10    Precautions:  Precautions  Precautions Comment: None      Objective   Lower Extremity Strength: (WNL unless documented below)   MMT 5/5 max  RIGHT LEFT   Knee Extension 5 4+ with pain    Knee Flexion 5 4+     Lower Extremity ROM: (WNL unless documented below)   ROM in Degrees  RIGHT LEFT   Knee Extension 0 0   Knee Flexion 136 136     Joint mobility: WNL  Girth: 1 cm effusion L knee   Palpation: TTP medial jt line L, patellar tendon L       KNEE RIGHT LEFT   Kayy's  +for clicking, min pain    Thessaly's   +    Joint Line Tenderness  +medial    End Range Flexion  +   End Range Extension   -   Anterior Drawer   -   Posterior Drawer  -   Valgus Stress   -   Varus Stress  -     Other Measures  Lower Extremity Funtional Score (LEFS): 55/80    Treatment:    Therapeutic exercise    Leg press 70 lbs x 10, 80 lbs x 10 with 3 sec hold   L leg press 40 lbs 2x10,. 3 sec hold  LAQ   TKE BTB 2x10  HS curl BTB 2x10    HEP updated:  Access Code: [email or web address]  URL: [email or web address]  Date: 01/09/2024  Prepared by: Isa  Chantel    Exercises  - Prone Press Up  - 7 x weekly - 10 reps - every 3-4 hours frequency   - Clamshell with Resistance  - 1 x daily - 7 x weekly - 2-3 sets - 10 reps  - Sidelying Reverse Clamshell with Resistance  - 1 x daily - 7 x weekly - 2-3 sets - 10 reps  - Supine Bridge with Resistance Band  - 1 x daily - 7 x weekly - 2-3 sets - 10 reps  - Prone Alternating Arm and Leg Lifts  - 1 x daily - 7 x weekly - 2-3 sets - 10 reps  - Supine Pelvic Tilt with Straight Leg Raise  - 1 x daily - 7 x weekly - 2 sets - 10 reps  - Seated Figure 4 Piriformis Stretch  - 1 x daily - 7 x weekly - 2 reps - 30 seconds hold  - Modified Zac Stretch  - 1 x daily - 7 x weekly - 2 reps - 30 seconds hold  - Standing Terminal Knee Extension with Resistance  - 1 x daily - 7 x weekly - 3 sets - 10 reps  - Seated Hamstring Curl with Anchored Resistance  - 1 x daily - 7 x weekly - 3 sets - 10 reps  - Sitting Knee Extension with Resistance  - 1 x daily - 7 x weekly - 3 sets - 10 reps  - Full Leg Press  - 1 x daily - 3 x weekly - 2-3 sets - 10 reps  - Single Leg Press  - 1 x daily - 3 x weekly - 2-3 sets - 10 reps  - Hamstring Curl with Weight Machine  - 1 x daily - 3 x weekly - 2-3 sets - 10 reps  - Single Leg Hamstring Curl with Weight Machine  - 1 x daily - 3 x weekly - 2-3 sets - 10 reps  - Eccentric Knee Extension with Weight Machine  - 1 x daily - 3 x weekly - 2-3 sets - 10 reps  - Single Leg Knee Extension with Weight Machine  - 1 x daily - 3 x weekly - 2-3 sets - 10 reps      Assessment  L knee was assessed today due to reports of fall on ice over the weekend. She demonstrates good ROM with some end range pain in flexion. L medial jt line pain and other tests indicative of possible MM px. She did well with exercises. We discussed appropriate equipment to use at gym     Plan  Continue to progress POC as tolerated by patient to improve strength, mobility and overall function    Goals:  Active       PT Problem       Reduce pain at worst  to 2/10 with all functional and recreational activity.        Start:  12/12/23    Expected End:  03/11/24            Increase ROM/flexibility to WFL to perform daily functional activities       Start:  12/12/23    Expected End:  03/11/24            Increase by > or = 1/2 mm grade to improve stability and body mechanics to perform daily activities without increased pain/compensation         Start:  12/12/23    Expected End:  03/11/24            Pt to have decrease in Oswestry by 10% to display increased overall function.        Start:  12/12/23    Expected End:  03/11/24            Patient will demonstrate independence in home program for support of progression       Start:  12/12/23    Expected End:  03/11/24               PT Problem       Pt to score an increase of 10 points or > on LEFS to display overall increased function.         Start:  01/09/24

## 2024-01-15 DIAGNOSIS — I10 PRIMARY HYPERTENSION: ICD-10-CM

## 2024-01-15 RX ORDER — CARVEDILOL 3.12 MG/1
3.12 TABLET ORAL
Qty: 180 TABLET | Refills: 0 | Status: SHIPPED | OUTPATIENT
Start: 2024-01-15 | End: 2024-03-14 | Stop reason: DRUGHIGH

## 2024-01-30 ENCOUNTER — APPOINTMENT (OUTPATIENT)
Dept: OTOLARYNGOLOGY | Facility: CLINIC | Age: 69
End: 2024-01-30
Payer: COMMERCIAL

## 2024-01-30 ENCOUNTER — APPOINTMENT (OUTPATIENT)
Dept: AUDIOLOGY | Facility: CLINIC | Age: 69
End: 2024-01-30
Payer: COMMERCIAL

## 2024-02-06 ENCOUNTER — SPECIALTY PHARMACY (OUTPATIENT)
Dept: PHARMACY | Facility: CLINIC | Age: 69
End: 2024-02-06

## 2024-02-06 ENCOUNTER — TELEPHONE (OUTPATIENT)
Dept: OBSTETRICS AND GYNECOLOGY | Facility: CLINIC | Age: 69
End: 2024-02-06
Payer: COMMERCIAL

## 2024-02-06 DIAGNOSIS — E78.9 LIPID DISORDER: Primary | ICD-10-CM

## 2024-02-06 DIAGNOSIS — M81.0 OSTEOPOROSIS WITHOUT CURRENT PATHOLOGICAL FRACTURE, UNSPECIFIED OSTEOPOROSIS TYPE: ICD-10-CM

## 2024-02-06 RX ORDER — ATORVASTATIN CALCIUM 20 MG/1
20 TABLET, FILM COATED ORAL DAILY
Qty: 30 TABLET | Refills: 2 | Status: SHIPPED | OUTPATIENT
Start: 2024-02-06 | End: 2024-05-14

## 2024-02-06 NOTE — TELEPHONE ENCOUNTER
Annabel called asking for Christine to call her in reference to the Prolia card. She has an appt on 3/5/24.

## 2024-02-13 ENCOUNTER — APPOINTMENT (OUTPATIENT)
Dept: OBSTETRICS AND GYNECOLOGY | Facility: CLINIC | Age: 69
End: 2024-02-13
Payer: COMMERCIAL

## 2024-02-27 ENCOUNTER — OFFICE VISIT (OUTPATIENT)
Dept: OTOLARYNGOLOGY | Facility: CLINIC | Age: 69
End: 2024-02-27
Payer: COMMERCIAL

## 2024-02-27 VITALS — WEIGHT: 125 LBS | HEIGHT: 62 IN | BODY MASS INDEX: 23 KG/M2

## 2024-02-27 DIAGNOSIS — H93.11 RIGHT-SIDED TINNITUS: ICD-10-CM

## 2024-02-27 DIAGNOSIS — H90.41 SENSORINEURAL HEARING LOSS (SNHL) OF RIGHT EAR WITH UNRESTRICTED HEARING OF LEFT EAR: Primary | ICD-10-CM

## 2024-02-27 PROBLEM — I71.20 THORACIC AORTIC ANEURYSM, WITHOUT RUPTURE, UNSPECIFIED (CMS-HCC): Status: ACTIVE | Noted: 2023-03-31

## 2024-02-27 PROBLEM — H91.90 HEARING LOSS: Status: ACTIVE | Noted: 2023-11-17

## 2024-02-27 PROBLEM — S86.919A MUSCLE STRAIN OF LOWER EXTREMITY: Status: ACTIVE | Noted: 2024-02-27

## 2024-02-27 PROBLEM — M19.09 PRIMARY OSTEOARTHRITIS, OTHER SPECIFIED SITE: Status: ACTIVE | Noted: 2023-09-29

## 2024-02-27 PROCEDURE — 1159F MED LIST DOCD IN RCRD: CPT | Performed by: STUDENT IN AN ORGANIZED HEALTH CARE EDUCATION/TRAINING PROGRAM

## 2024-02-27 PROCEDURE — 1160F RVW MEDS BY RX/DR IN RCRD: CPT | Performed by: STUDENT IN AN ORGANIZED HEALTH CARE EDUCATION/TRAINING PROGRAM

## 2024-02-27 PROCEDURE — 1126F AMNT PAIN NOTED NONE PRSNT: CPT | Performed by: STUDENT IN AN ORGANIZED HEALTH CARE EDUCATION/TRAINING PROGRAM

## 2024-02-27 PROCEDURE — 1036F TOBACCO NON-USER: CPT | Performed by: STUDENT IN AN ORGANIZED HEALTH CARE EDUCATION/TRAINING PROGRAM

## 2024-02-27 PROCEDURE — 99203 OFFICE O/P NEW LOW 30 MIN: CPT | Performed by: STUDENT IN AN ORGANIZED HEALTH CARE EDUCATION/TRAINING PROGRAM

## 2024-02-27 RX ORDER — LOTEPREDNOL ETABONATE 5 MG/G
1 GEL OPHTHALMIC 4 TIMES DAILY
COMMUNITY
Start: 2019-01-11 | End: 2024-03-14 | Stop reason: WASHOUT

## 2024-02-27 RX ORDER — RALOXIFENE HYDROCHLORIDE 60 MG/1
60 TABLET, FILM COATED ORAL DAILY
COMMUNITY
Start: 2016-06-03 | End: 2024-03-14 | Stop reason: WASHOUT

## 2024-02-27 RX ORDER — CHOLECALCIFEROL (VITAMIN D3) 50 MCG
2000 TABLET ORAL DAILY
COMMUNITY
Start: 2015-10-30

## 2024-02-27 NOTE — PROGRESS NOTES
CHIEF COMPLAINT:   Chief Complaint   Patient presents with    abnormal adio       HISTORY OF PRESENT ILLNESS: Annabel Bejarano is a 69 y.o. female who presents today with symptoms of hearing loss in the right ear.  She reports that this has been present for multiple years.  She does not use hearing aids.  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  She is otherwise healthy other than hypertension.  She does report intermittent right sided non-pulsatile tinnitus.  She also denies ear tubes or ear surgery.      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/2015    Central corneal ulcer of right eye    Chronic rhinitis     Rhinitis    Chronic rhinitis 07/13/2018    Rhinitis    Chronic rhinitis 01/05/2018    Rhinitis    Conjunctival hyperemia, right eye 10/02/2015    Conjunctival hyperemia of right eye    Disorder of lipoprotein metabolism, 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    Lipid disorder    Disorder of lipoprotein metabolism, unspecified 01/05/2018    Lipid disorder    Disorder of the skin and subcutaneous tissue, 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    Skin lesion    Disorder of the skin and subcutaneous tissue, unspecified 01/05/2018    Skin lesion    Dry eye syndrome of left lacrimal gland 12/11/2014    Dry eye syndrome of left lacrimal gland    Dry eye syndrome of left lacrimal gland 11/26/2014    Dry eye syndrome of left lacrimal gland    Dry eye syndrome of left lacrimal gland 11/26/2014    Dry eye syndrome of left lacrimal gland    Dry eye syndrome of right lacrimal gland 12/11/2014    Dry eye  syndrome of right lacrimal gland    Dry eye syndrome of right lacrimal gland 11/26/2014    Dry eye syndrome of right lacrimal gland    Dry eye syndrome of right lacrimal gland 11/26/2014    Dry eye syndrome of right lacrimal gland    Dry eye syndrome of unspecified lacrimal gland 01/05/2018    Dry eye syndrome    Encounter for screening for malignant neoplasm of rectum 07/13/2018    Screening for rectal cancer    Encounter for screening for malignant neoplasm of rectum 01/05/2018    Screening for rectal cancer    Encounter for screening mammogram for malignant neoplasm of breast 07/13/2018    Visit for screening mammogram    Encounter for screening mammogram for malignant neoplasm of breast 01/05/2018    Visit for screening mammogram    Epithelial (juvenile) corneal dystrophy, 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    Epithelial (juvenile) corneal dystrophy, 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    Essential (primary) hypertension 01/27/2014    Benign essential hypertension    Essential (primary) hypertension 07/13/2018    Essential hypertension    Essential (primary) hypertension 01/05/2018    Essential hypertension    Hypothyroidism, unspecified 09/21/2022    Hypothyroidism    Lichen sclerosus et atrophicus 07/13/2018    Lichen sclerosus et atrophicus    Lichen sclerosus et atrophicus 01/05/2018    Lichen sclerosus et atrophicus    Nonrheumatic aortic (valve) insufficiency 07/13/2018    Moderate aortic regurgitation    Nonrheumatic aortic (valve) insufficiency 01/05/2018    Moderate aortic regurgitation    Nonrheumatic mitral (valve) insufficiency 01/05/2018    Non-rheumatic mitral regurgitation    Nonrheumatic mitral (valve) insufficiency 07/13/2018    Non-rheumatic mitral regurgitation    Nonrheumatic mitral (valve) prolapse 07/13/2018    MVP (mitral valve prolapse)    Nonrheumatic mitral (valve) prolapse 01/05/2018    MVP (mitral valve prolapse)    Other  age-related incipient cataract, left eye 10/02/2015    Other age-related incipient cataract of left eye    Other age-related incipient cataract, right eye 10/02/2015    Other age-related incipient cataract of right eye    Other conditions influencing health status     Aortic sclerosis    Other specified diseases of blood and blood-forming organs 07/13/2018    Macrocytosis    Other specified diseases of blood and blood-forming organs 01/05/2018    Macrocytosis    Other specified disorders of bone density and structure, unspecified site 07/13/2018    Osteopenia    Other specified disorders of bone density and structure, unspecified site 01/05/2018    Osteopenia    Perianal venous thrombosis 07/13/2018    Hemorrhoids, internal, thrombosed    Perianal venous thrombosis 01/05/2018    Hemorrhoids, internal, thrombosed    Personal history of other diseases of the musculoskeletal system and connective tissue     History of osteopenia    Postmenopausal atrophic vaginitis 07/13/2018    Atrophic vaginitis    Postmenopausal atrophic vaginitis 01/05/2018    Atrophic vaginitis    Thoracic aortic ectasia (CMS/HCC) 07/13/2018    Ascending aorta dilatation    Thoracic aortic ectasia (CMS/HCC) 01/05/2018    Ascending aorta dilatation    Unspecified blepharitis unspecified eye, unspecified eyelid 01/05/2018    Blepharitis    Unspecified corneal ulcer, right eye 10/02/2015    Corneal ulcer of right eye    Vitreous degeneration, left eye 10/02/2015    Vitreous degeneration of left eye    Vitreous degeneration, left eye 10/02/2015    Vitreous degeneration of left eye       PAST SURGICAL HISTORY:   Past Surgical History:   Procedure Laterality Date    COLONOSCOPY  10/30/2015    Colonoscopy (Fiberoptic)    CORNEA LACERATION REPAIR Left     05/2023    FOOT SURGERY  05/30/2014    Foot Surgery    OTHER SURGICAL HISTORY  11/08/2022    Cataract surgery    OTHER SURGICAL HISTORY  07/24/2020    Cervical loop electrosurgical excision        MEDICATIONS:   Current Outpatient Medications:     cholecalciferol (Vitamin D-3) 50 MCG (2000 UT) tablet, Take 1 tablet (2,000 Units) by mouth once daily., Disp: , Rfl:     fluocinolone and shower cap 0.01 % oil, APPLY TO RASH ON SCALP TWICE A DAY, Disp: 118.28 mL, Rfl: 4    loteprednol (Lotemax) 0.5 % ophthalmic gel, Administer 1 drop into the right eye 4 times a day., Disp: , Rfl:     raloxifene (Evista) 60 mg tablet, Take 1 tablet (60 mg) by mouth once daily., Disp: , Rfl:     atorvastatin (Lipitor) 20 mg tablet, TAKE 1 TABLET BY MOUTH ONCE DAILY, Disp: 90 tablet, Rfl: 3    atorvastatin (Lipitor) 20 mg tablet, Take 1 tablet (20 mg) by mouth once daily., Disp: 30 tablet, Rfl: 2    carvedilol (Coreg) 3.125 mg tablet, TAKE 1 TABLET (3.125 MG) BY MOUTH 2 TIMES A DAY WITH MEALS., Disp: 180 tablet, Rfl: 0    denosumab (Prolia) 60 mg/mL syringe, PHYSICIANS OFFICE TO INJECT 60MG (1 SYRINGE) SUBCUTANEOUSLY ONCE EVERY 6 MONTHS., Disp: 1 mL, Rfl: 1    fexofenadine (Allegra) 180 mg tablet, Take 1 tablet (180 mg) by mouth once daily as needed., Disp: , Rfl:     fluocinolone (Derma-Smoothe) 0.01 % external oil, Apply to rash on scalp twice a day, Disp: , Rfl:     fluticasone (Flonase) 50 mcg/actuation nasal spray, Administer 2 sprays into affected nostril(s) once daily., Disp: , Rfl:     levothyroxine (Synthroid, Levoxyl) 100 mcg tablet, TAKE 1 TABLET BY MOUTH ONCE DAILY, Disp: 90 tablet, Rfl: 3    lisinopril 5 mg tablet, Take 1 tablet (5 mg) by mouth once daily., Disp: , Rfl:     MULTIVITAMIN-FERROUS FUMARATE-FOLIC ACID 9 MG-200 MCG TABLET, HALF TABLET, (Centrum), Take 1 half tablet by mouth once daily., Disp: , Rfl:     peg 400-propylene glycol 0.4-0.3 % drops, Administer into affected eye(s)., Disp: , Rfl:     Prolia 60 mg/mL syringe, Inject under the skin., Disp: , Rfl:     triamcinolone (Kenalog) 0.1 % cream, USE ON ECZEMA RASH 2 TIMES DAILY, Disp: 30 g, Rfl: 3    ALLERGIES:   Allergies   Allergen Reactions     "Cephalosporins Other    Kiwi Hives    Latex Other    Nut - Unspecified Hives     Pine Nuts    Penicillins Other       SOCIAL HISTORY:   reports that she has never smoked. She has never used smokeless tobacco. She reports current alcohol use of about 1.0 - 2.0 standard drink of alcohol per week. She reports that she does not use drugs.    PHYSICAL EXAM:    VITALS:   Vitals:    02/27/24 0825   Weight: 56.7 kg (125 lb)   Height: 1.575 m (5' 2\")        GENERAL: healthy, alert, well developed, well nourished, no distress, cooperative, appears chronologic age    Communicates with normal voice and without hearing aids.    HEAD: atraumatic, normocephalic, no lesions    EYES: normal, PERRLA and EOM's intact    EARS:   Right ear demonstrates a patent external auditory canal with a normal tympanic membrane.    Left ear: demonstrates a patent external auditory canal with a normal tympanic membrane.   Harris tuning fork test lateralizes Left 512 Hz.   Rinne testing with a 512 Hz tuning fork: Right Air conduction > Bone conduction   Left Air conduction > Bone conduction      NOSE: nose shows no deformity, asymmetry, or inflammation, nasal mucosa normal    ORAL CAVITY/OROPHARYNX: negative findings: lips normal without lesions, buccal mucosa normal, gums healthy, teeth intact, non-carious, palate normal, tongue midline and normal, soft palate, uvula, and tonsils normal    NECK AND SALIVARY GLANDS: Neck is supple without masses or lymphadenopathy. Palpation of the parotid and submandibular gland reveals no masses or tenderness to palpation.    CRANIAL NERVES: intact,   Facial nerve exam  is House - Brackmann 1- Normal Function on the right and 1- Normal Function on the left.    CARDIOVASCULAR: No evidence of peripheral edema    PULMONARY: normal lung excursion, no evidence of retractions    DATA REVIEWED:  Audiogram 11/17/2023  I reviewed the audiogram from 11/17/2023, which demonstrated right high frequency moderate sensorineural " hearing loss only at 8000 Hz.  Normal left hearing.  Significant asymmetry only at 8000 Hz.  She has bilateral type A tympanograms.  She has 100% word recognition score bilaterally.      IMPRESSION:   1) Right high frequency sensorineural hearing loss  2) right tinnitus    PLAN:  I discussed the pathophysiology of sensorineural hearing loss and that this most likely represents hearing loss secondary to noise exposure and time.  However, I typically would expect asymmetric decline in hearing.  There is a significant asymmetry at 8000 Hz.  For this reason, in addition to the right-sided tinnitus, I would recommend an MRI IAC with and without contrast to evaluate for retrocochlear pathology.  I also recommend a BMP to check for kidney function prior to the MRI.  She will follow-up in 4 to 6 weeks with a virtual visit to review the results of the MRI.    Graeme Stuart MD

## 2024-02-29 ENCOUNTER — PHARMACY VISIT (OUTPATIENT)
Dept: PHARMACY | Facility: CLINIC | Age: 69
End: 2024-02-29
Payer: COMMERCIAL

## 2024-02-29 PROCEDURE — RXMED WILLOW AMBULATORY MEDICATION CHARGE

## 2024-03-01 ENCOUNTER — LAB (OUTPATIENT)
Dept: LAB | Facility: LAB | Age: 69
End: 2024-03-01
Payer: COMMERCIAL

## 2024-03-01 DIAGNOSIS — M81.0 OSTEOPOROSIS WITHOUT CURRENT PATHOLOGICAL FRACTURE, UNSPECIFIED OSTEOPOROSIS TYPE: ICD-10-CM

## 2024-03-01 DIAGNOSIS — H90.41 SENSORINEURAL HEARING LOSS (SNHL) OF RIGHT EAR WITH UNRESTRICTED HEARING OF LEFT EAR: ICD-10-CM

## 2024-03-01 LAB
ANION GAP SERPL CALC-SCNC: 11 MMOL/L (ref 10–20)
BUN SERPL-MCNC: 14 MG/DL (ref 6–23)
CALCIUM SERPL-MCNC: 9.3 MG/DL (ref 8.6–10.6)
CHLORIDE SERPL-SCNC: 106 MMOL/L (ref 98–107)
CO2 SERPL-SCNC: 28 MMOL/L (ref 21–32)
CREAT SERPL-MCNC: 0.56 MG/DL (ref 0.5–1.05)
EGFRCR SERPLBLD CKD-EPI 2021: >90 ML/MIN/1.73M*2
GLUCOSE SERPL-MCNC: 79 MG/DL (ref 74–99)
POTASSIUM SERPL-SCNC: 4.4 MMOL/L (ref 3.5–5.3)
SODIUM SERPL-SCNC: 141 MMOL/L (ref 136–145)

## 2024-03-01 PROCEDURE — 80048 BASIC METABOLIC PNL TOTAL CA: CPT

## 2024-03-01 PROCEDURE — 36415 COLL VENOUS BLD VENIPUNCTURE: CPT

## 2024-03-05 ENCOUNTER — OFFICE VISIT (OUTPATIENT)
Dept: OBSTETRICS AND GYNECOLOGY | Facility: CLINIC | Age: 69
End: 2024-03-05
Payer: COMMERCIAL

## 2024-03-05 VITALS
DIASTOLIC BLOOD PRESSURE: 70 MMHG | WEIGHT: 126 LBS | SYSTOLIC BLOOD PRESSURE: 120 MMHG | HEIGHT: 64 IN | BODY MASS INDEX: 21.51 KG/M2

## 2024-03-05 DIAGNOSIS — Z01.419 ENCOUNTER FOR WELL WOMAN EXAM WITH ROUTINE GYNECOLOGICAL EXAM: Primary | ICD-10-CM

## 2024-03-05 DIAGNOSIS — Z12.31 ENCOUNTER FOR SCREENING MAMMOGRAM FOR MALIGNANT NEOPLASM OF BREAST: ICD-10-CM

## 2024-03-05 DIAGNOSIS — M81.0 OSTEOPOROSIS, UNSPECIFIED OSTEOPOROSIS TYPE, UNSPECIFIED PATHOLOGICAL FRACTURE PRESENCE: ICD-10-CM

## 2024-03-05 PROCEDURE — 3074F SYST BP LT 130 MM HG: CPT | Performed by: NURSE PRACTITIONER

## 2024-03-05 PROCEDURE — 1159F MED LIST DOCD IN RCRD: CPT | Performed by: NURSE PRACTITIONER

## 2024-03-05 PROCEDURE — 3078F DIAST BP <80 MM HG: CPT | Performed by: NURSE PRACTITIONER

## 2024-03-05 PROCEDURE — 1126F AMNT PAIN NOTED NONE PRSNT: CPT | Performed by: NURSE PRACTITIONER

## 2024-03-05 PROCEDURE — 1036F TOBACCO NON-USER: CPT | Performed by: NURSE PRACTITIONER

## 2024-03-05 PROCEDURE — 99397 PER PM REEVAL EST PAT 65+ YR: CPT | Performed by: NURSE PRACTITIONER

## 2024-03-05 PROCEDURE — 96372 THER/PROPH/DIAG INJ SC/IM: CPT | Performed by: NURSE PRACTITIONER

## 2024-03-05 PROCEDURE — 1160F RVW MEDS BY RX/DR IN RCRD: CPT | Performed by: NURSE PRACTITIONER

## 2024-03-05 NOTE — PROGRESS NOTES
"     HPI:   Annabel Bejarano is a 69 y.o. who presents today for her annual gynecologic exam without complaints    She has the following concerns; None.     Last Pap and HPV returned negative 10/25/2021. Hx of LEEP in 1999. No abnormal PAP's since. PAP no longer indicated.   DEXA 9/25/2020 returned with osteoporosis with T score -2.2 in hip and -2.3 in spine. She has been on Prolia. She brings her Prolia with her today- due for next injection.   She is due for a colonoscopy this year. Plans on intermediate soon, will get colonoscopy done after this. She has no gyn concerns, denies any postmenopausal bleeding.     GYN HISTORY:  Denies any pmb.     PAP History   Last pap:   2021 Normal HPV Negative  History of abnormal pap: yes - remote hx of abnormal PAP and subsequent LEEP.   HPV vaccine: no  @paphx@    Health Screening  Family history of breast, uterine, ovarian or colon cancer: no   Breast cancer: mammogram - recently done in Dec. 2023, Cat. 1. Last mammogram: 2023    Colon cancer: plans to get this done this year.      The patient feels safe at home.         Review of Systems:   Constitutional: no fever and no chills.  Cardiovascular: no chest pain.   Respiratory: no shortness of breath.   Gastrointestinal: no nausea, no abdominal pain and no constipation  Genitourinary: no dysuria, no urinary incontinence, no vaginal dryness, no pelvic pain and no vaginal discharge.   Neurological: no headache.  Psychiatric: no anxiety and no depression.              Objective         /70   Ht 1.62 m (5' 3.78\")   Wt 57.2 kg (126 lb)   LMP  (Exact Date)   BMI 21.78 kg/m²         Physical Exam:   Constitutional: Alert and in no acute distress. Well developed, well nourished.      Neck: No neck asymmetry. Supple. Thyroid not enlarged and there were no palpable thyroid nodules.      Cardiovascular: Heart rate and rhythm were normal, normal S1 and S2, no gallops, and no murmurs.      Pulmonary: No respiratory distress. " Clear bilateral breath sounds.      Chest: Breasts: Normal appearance, no nipple discharge and no skin changes. Palpation of breasts and axillae: No palpable mass and no axillary lymphadenopathy.      Abdomen: Soft nontender; no abdominal mass palpated. Normal bowel sounds. No organomegaly.      Genitourinary:   - External genitalia: Normal.   - Palpation of lymph nodes in groin: No inguinal lymphadenopathy.   - Bartholin's Urethral and Skenes Glands: Normal.   - Urethra: Normal.    -Bladder: Normal on palpation.   - Vagina: Normal.   - Cervix: Normal.   - Uterus: Normal. Right Adnexa/parametria: Normal. Left Adnexa/parametria: Normal.   - Perianal Area: Normal.      Skin: Normal skin color and pigmentation, normal skin turgor, and no rash     Psychiatric: Alert and oriented x 3. Affect normal to patient baseline. Mood: Appropriate.            Assessment/Plan       Diagnoses and all orders for this visit:  Encounter for well woman exam with routine gynecological exam  Annabel is a rodríguez patient who presents for well woman exam. She is doing well; has no GYN concerns today. She is due for a DEXA scan; currently on Prolia. PAP no longer indicated after age 65. She will schedule her colonoscopy. She is active- she walks and lifts weights at the gym 3x per day. She was encouraged to continue weight bearing exercise and Vit D supplementation, adequate nutritional sources of calcium.   Encounter for screening mammogram for malignant neoplasm of breast  -     BI mammo bilateral screening tomosynthesis; Future  Osteoporosis, unspecified osteoporosis type, unspecified pathological fracture presence  -     XR DEXA bone density; Future  -     denosumab (Prolia) injection 60 mg  Follow-up biennially; sooner if needed.       Lupis Brand, APRN-CNP

## 2024-03-14 ENCOUNTER — OFFICE VISIT (OUTPATIENT)
Dept: CARDIOLOGY | Facility: CLINIC | Age: 69
End: 2024-03-14
Payer: COMMERCIAL

## 2024-03-14 VITALS
WEIGHT: 129 LBS | SYSTOLIC BLOOD PRESSURE: 137 MMHG | BODY MASS INDEX: 22.86 KG/M2 | DIASTOLIC BLOOD PRESSURE: 77 MMHG | OXYGEN SATURATION: 96 % | HEIGHT: 63 IN | HEART RATE: 88 BPM

## 2024-03-14 DIAGNOSIS — I34.0 NON-RHEUMATIC MITRAL REGURGITATION: ICD-10-CM

## 2024-03-14 DIAGNOSIS — I34.1 MVP (MITRAL VALVE PROLAPSE): ICD-10-CM

## 2024-03-14 DIAGNOSIS — R94.31 ABNORMAL EKG: Primary | ICD-10-CM

## 2024-03-14 DIAGNOSIS — I77.810 ASCENDING AORTA DILATATION (CMS-HCC): ICD-10-CM

## 2024-03-14 DIAGNOSIS — E78.9 LIPID DISORDER: ICD-10-CM

## 2024-03-14 DIAGNOSIS — I35.1 MODERATE AORTIC REGURGITATION: ICD-10-CM

## 2024-03-14 DIAGNOSIS — L73.9 FOLLICULITIS: Primary | ICD-10-CM

## 2024-03-14 DIAGNOSIS — I10 ESSENTIAL HYPERTENSION: ICD-10-CM

## 2024-03-14 PROCEDURE — 93010 ELECTROCARDIOGRAM REPORT: CPT | Performed by: INTERNAL MEDICINE

## 2024-03-14 PROCEDURE — 1160F RVW MEDS BY RX/DR IN RCRD: CPT | Performed by: INTERNAL MEDICINE

## 2024-03-14 PROCEDURE — 99214 OFFICE O/P EST MOD 30 MIN: CPT | Performed by: INTERNAL MEDICINE

## 2024-03-14 PROCEDURE — 3078F DIAST BP <80 MM HG: CPT | Performed by: INTERNAL MEDICINE

## 2024-03-14 PROCEDURE — 1159F MED LIST DOCD IN RCRD: CPT | Performed by: INTERNAL MEDICINE

## 2024-03-14 PROCEDURE — 1036F TOBACCO NON-USER: CPT | Performed by: INTERNAL MEDICINE

## 2024-03-14 PROCEDURE — 1126F AMNT PAIN NOTED NONE PRSNT: CPT | Performed by: INTERNAL MEDICINE

## 2024-03-14 PROCEDURE — 3075F SYST BP GE 130 - 139MM HG: CPT | Performed by: INTERNAL MEDICINE

## 2024-03-14 PROCEDURE — 93005 ELECTROCARDIOGRAM TRACING: CPT | Performed by: INTERNAL MEDICINE

## 2024-03-14 RX ORDER — DOXYCYCLINE HYCLATE 100 MG
100 TABLET ORAL 2 TIMES DAILY
Qty: 20 TABLET | Refills: 1 | Status: SHIPPED | OUTPATIENT
Start: 2024-03-14 | End: 2024-05-28 | Stop reason: SDUPTHER

## 2024-03-14 RX ORDER — CARVEDILOL 6.25 MG/1
6.25 TABLET ORAL
Qty: 180 TABLET | Refills: 3 | Status: SHIPPED | OUTPATIENT
Start: 2024-03-14 | End: 2025-03-14

## 2024-03-14 ASSESSMENT — PAIN SCALES - GENERAL: PAINLEVEL: 0-NO PAIN

## 2024-03-14 ASSESSMENT — ENCOUNTER SYMPTOMS: DEPRESSION: 0

## 2024-03-14 NOTE — PATIENT INSTRUCTIONS
1.  Increase carvedilol to 6.25 mg twice daily.  Periodically monitor blood pressure at home or in the office.  Let us know if you notice any side effects from the higher dose of the beta-blocker.    2.  Follow-up in 1 year, with cardiac MRI prior to the office visit.

## 2024-03-14 NOTE — PROGRESS NOTES
Primary Care Physician: Shukri Barreto MD  Date of Visit: 03/14/2024  8:00 AM EDT  Location of visit: [facility] [address]   Last office visit: March 9, 2023    Chief Complaint:     Annual follow-up thoracic aortic ectasia    HPI/Summary  Annabel Bejarano is a 69 y.o. female who presents for followup cardiology evaluation.     The patient has been recognized with mild dilatation of the ascending aorta, which measured 4.2 cm on CTA.  Also, mild aortic insufficiency.  The last echo was in 2021.  CTA chest on April 1, 2023 showed a stable mild aneurysmal dilatation of the thoracic aorta, and mildly effaced sinotubular junction.  Mid ascending aorta measured 4.2 x 4.2 cm.  Stable as compared to 2021.    Currently, no symptoms.  She exercises for 30 to 40 minutes 3-4 times weekly, with an elliptical or treadmill.  She also walks for exercise, and walks a half marathon with family members once yearly.  Remains compliant with all medications.        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/2015    Central corneal ulcer of right eye    Chronic rhinitis     Rhinitis    Chronic rhinitis 07/13/2018    Rhinitis    Chronic rhinitis 01/05/2018    Rhinitis    Conjunctival hyperemia, right eye 10/02/2015    Conjunctival hyperemia of right eye    Disorder of the skin and subcutaneous tissue, 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    Skin lesion    Disorder of the skin and subcutaneous tissue, unspecified 01/05/2018    Skin lesion    Dry eye syndrome of left lacrimal gland 12/11/2014    Dry eye syndrome of left lacrimal gland    Dry eye syndrome of left lacrimal gland 11/26/2014    Dry eye  syndrome of left lacrimal gland    Dry eye syndrome of left lacrimal gland 11/26/2014    Dry eye syndrome of left lacrimal gland    Dry eye syndrome of right lacrimal gland 12/11/2014    Dry eye syndrome of right lacrimal gland    Dry eye syndrome of right lacrimal gland 11/26/2014    Dry eye syndrome of right lacrimal gland    Dry eye syndrome of right lacrimal gland 11/26/2014    Dry eye syndrome of right lacrimal gland    Dry eye syndrome of unspecified lacrimal gland 01/05/2018    Dry eye syndrome    Encounter for screening for malignant neoplasm of rectum 07/13/2018    Screening for rectal cancer    Encounter for screening for malignant neoplasm of rectum 01/05/2018    Screening for rectal cancer    Encounter for screening mammogram for malignant neoplasm of breast 07/13/2018    Visit for screening mammogram    Encounter for screening mammogram for malignant neoplasm of breast 01/05/2018    Visit for screening mammogram    Epithelial (juvenile) corneal dystrophy, 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    Epithelial (juvenile) corneal dystrophy, 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    Hypothyroidism, unspecified 09/21/2022    Hypothyroidism    Lichen sclerosus et atrophicus 07/13/2018    Lichen sclerosus et atrophicus    Lichen sclerosus et atrophicus 01/05/2018    Lichen sclerosus et atrophicus    Nonrheumatic aortic (valve) insufficiency 07/13/2018    Moderate aortic regurgitation    Nonrheumatic aortic (valve) insufficiency 01/05/2018    Moderate aortic regurgitation    Nonrheumatic mitral (valve) insufficiency 01/05/2018    Non-rheumatic mitral regurgitation    Nonrheumatic mitral (valve) insufficiency 07/13/2018    Non-rheumatic mitral regurgitation    Nonrheumatic mitral (valve) prolapse 07/13/2018    MVP (mitral valve prolapse)    Nonrheumatic mitral (valve) prolapse 01/05/2018    MVP (mitral valve prolapse)    Other age-related incipient  cataract, left eye 10/02/2015    Other age-related incipient cataract of left eye    Other age-related incipient cataract, right eye 10/02/2015    Other age-related incipient cataract of right eye    Other conditions influencing health status     Aortic sclerosis    Other specified diseases of blood and blood-forming organs 07/13/2018    Macrocytosis    Other specified diseases of blood and blood-forming organs 01/05/2018    Macrocytosis    Other specified disorders of bone density and structure, unspecified site 07/13/2018    Osteopenia    Other specified disorders of bone density and structure, unspecified site 01/05/2018    Osteopenia    Perianal venous thrombosis 07/13/2018    Hemorrhoids, internal, thrombosed    Perianal venous thrombosis 01/05/2018    Hemorrhoids, internal, thrombosed    Personal history of other diseases of the musculoskeletal system and connective tissue     History of osteopenia    Postmenopausal atrophic vaginitis 07/13/2018    Atrophic vaginitis    Postmenopausal atrophic vaginitis 01/05/2018    Atrophic vaginitis    Unspecified blepharitis unspecified eye, unspecified eyelid 01/05/2018    Blepharitis    Unspecified corneal ulcer, right eye 10/02/2015    Corneal ulcer of right eye    Vitreous degeneration, left eye 10/02/2015    Vitreous degeneration of left eye    Vitreous degeneration, left eye 10/02/2015    Vitreous degeneration of left eye          Past Surgical History:   Procedure Laterality Date    COLONOSCOPY  10/30/2015    Colonoscopy (Fiberoptic)    CORNEA LACERATION REPAIR Left     05/2023    FOOT SURGERY  05/30/2014    Foot Surgery    OTHER SURGICAL HISTORY  11/08/2022    Cataract surgery    OTHER SURGICAL HISTORY  07/24/2020    Cervical loop electrosurgical excision            Social History     Tobacco Use    Smoking status: Never    Smokeless tobacco: Never   Vaping Use    Vaping Use: Never used   Substance Use Topics    Alcohol use: Yes     Alcohol/week: 1.0 - 2.0 standard  "drink of alcohol     Types: 1 - 2 Glasses of wine per week    Drug use: Never        Physical Activity: Not on file            Allergies   Allergen Reactions    Cephalosporins Other    Kiwi Hives    Latex Other    Nut - Unspecified Hives     Pine Nuts    Penicillins Other         Current Outpatient Medications   Medication Instructions    atorvastatin (LIPITOR) 20 mg, oral, Daily    carvedilol (COREG) 6.25 mg, oral, 2 times daily with meals    cholecalciferol (VITAMIN D-3) 2,000 Units, oral, Daily    denosumab (Prolia) 60 mg/mL syringe PHYSICIANS OFFICE TO INJECT 60MG (1 SYRINGE) SUBCUTANEOUSLY ONCE EVERY 6 MONTHS.    fexofenadine (Allegra) 180 mg tablet 1 tablet, oral, Daily PRN    fluocinolone (Derma-Smoothe) 0.01 % external oil Apply to rash on scalp twice a day    fluocinolone and shower cap 0.01 % oil APPLY TO RASH ON SCALP TWICE A DAY    fluticasone (Flonase) 50 mcg/actuation nasal spray 2 sprays, nasal, Daily    levothyroxine (Synthroid, Levoxyl) 100 mcg tablet TAKE 1 TABLET BY MOUTH ONCE DAILY    lisinopril 5 mg tablet 1 tablet, oral, Daily    MULTIVITAMIN-FERROUS FUMARATE-FOLIC ACID 9 MG-200 MCG TABLET, HALF TABLET, (Centrum) 1 tablet, oral, Daily    peg 400-propylene glycol 0.4-0.3 % drops ophthalmic (eye)    triamcinolone (Kenalog) 0.1 % cream USE ON ECZEMA RASH 2 TIMES DAILY       ROS     Vital Signs:  Vitals:    03/14/24 0754   BP: 137/77   BP Location: Right arm   Patient Position: Sitting   BP Cuff Size: Adult long   Pulse: 88   SpO2: 96%   Weight: 58.5 kg (129 lb)   Height: 1.6 m (5' 3\")     Wt Readings from Last 2 Encounters:   03/14/24 58.5 kg (129 lb)   03/05/24 57.2 kg (126 lb)     Body mass index is 22.85 kg/m².       Physical Exam:    She appeared well.  She was not in any pain.  Neck veins not apparent.  Clear lung fields.  Heart sounds regular.  I was unable to detect a murmur of aortic insufficiency.  No edema.     Last Labs:  CMP:  Recent Labs     03/01/24  0858 09/29/23  0834 " "03/29/23  0757 10/14/22  0920 08/13/21  0846    142 142 142 142   K 4.4 4.6 4.1 4.2 4.3    104 106 105 104   CO2 28 29 31 30 31   ANIONGAP 11 14 9* 11 11   BUN 14 14 14 11 14   CREATININE 0.56 0.61 0.58 0.62 0.74   EGFR >90  --   --   --   --    GLUCOSE 79 89 75 92 85     Recent Labs     09/29/23  0834 10/14/22  0920 08/13/21  0846 08/13/20  0721 08/09/19  0735   ALBUMIN 4.1 4.2 4.2 4.1 3.9   ALKPHOS 64 77 58 65 64   ALT 7 12 11 11 10   AST 21 26 23 28 23   BILITOT 0.5 0.5 0.6 0.5 0.4     CBC:  Recent Labs     09/29/23  0834 10/14/22  0920 08/13/21  0846 08/13/20  0721 08/09/19  0735   WBC 4.9 3.2* 4.0* 3.5* 3.3*   HGB 12.9 12.7 13.7 13.3 12.8   HCT 41.3 38.9 42.8 40.9 40.5    191 201 200 203   * 99 101* 102* 100     COAG: No results for input(s): \"INR\", \"DDIMERVTE\" in the last 49401 hours.  HEME/ENDO:  Recent Labs     09/29/23  0834 10/14/22  0920 08/13/21  0846 08/13/20  0721   TSH 0.29* 0.74 0.60 0.91      CARDIAC: No results for input(s): \"LDH\", \"CKMB\", \"TROPHS\", \"BNP\" in the last 44170 hours.    No lab exists for component: \"CK\", \"CKMBP\"  Recent Labs     09/29/23  0834 10/14/22  0920 08/13/21  0846   CHOL 151 164 160   LDLF 67 84 79   HDL 69.9 63.9 68.5   TRIG 69 80 65       Last Cardiology Tests:    ECG:    Tracing performed today shows normal sinus rhythm, possible left atrial enlargement, borderline ECG.  Heart rate 81/min.    Echo:  Echo Results:  No results found for this or any previous visit from the past 3650 days.       Cath:      Stress Test:  Stress Results:  No results found for this or any previous visit from the past 365 days.         Cardiac Imaging:        Assessment/Plan     Patient has mild dilatation of the ascending aorta, stable since 2016.  We have been performing imaging studies every 2 years.  The echocardiogram in 2021 showed a trileaflet aortic valve with mild aortic insufficiency.  We reviewed the CTA performed last year with the patient which shows stable " dilatation of the ascending aorta.  We reviewed vital signs, and decided to uptitrate carvedilol slightly, to 6.25 mg twice daily for control of hypertension and to keep the heart rate closer to 60/min at rest.  A prescription was provided.  We plan to obtain a cardiac MRI next year for surveillance rather than a CT scan and this will be performed prior to the next office visit.  She will give the office a call and we will place the order closer to the time of the evaluation.      Orders:  Orders Placed This Encounter   Procedures    ECG 12 lead (Clinic Performed)      Followup Appts:  Future Appointments   Date Time Provider Department Center   3/16/2024  9:00 AM [facility] MRI 1 [facility]   4/23/2024  8:00 AM Graeme Stuart MD [id number] [facility]   7/12/2024  8:30 AM Krystle Thompson MD [id number] [facility]   9/30/2024  8:00 AM Shukri Barreto MD [id number] [facility]   3/17/2025  8:00 AM Sai Linares MD [id number] [facility]           ____________________________________________________________  Sai Linares MD    Senior Attending Physician  [facility] [place]  [facility] Chair for Cardiovascular Excellence  [employer] [employer]

## 2024-03-15 LAB
ATRIAL RATE: 81 BPM
P AXIS: 69 DEGREES
P OFFSET: 176 MS
P ONSET: 128 MS
PR INTERVAL: 184 MS
Q ONSET: 220 MS
QRS COUNT: 13 BEATS
QRS DURATION: 80 MS
QT INTERVAL: 404 MS
QTC CALCULATION(BAZETT): 469 MS
QTC FREDERICIA: 446 MS
R AXIS: -1 DEGREES
T AXIS: 58 DEGREES
T OFFSET: 422 MS
VENTRICULAR RATE: 81 BPM

## 2024-03-16 ENCOUNTER — HOSPITAL ENCOUNTER (OUTPATIENT)
Dept: RADIOLOGY | Facility: HOSPITAL | Age: 69
Discharge: HOME | End: 2024-03-16
Payer: COMMERCIAL

## 2024-03-16 DIAGNOSIS — H90.41 SENSORINEURAL HEARING LOSS (SNHL) OF RIGHT EAR WITH UNRESTRICTED HEARING OF LEFT EAR: ICD-10-CM

## 2024-03-16 PROCEDURE — 70553 MRI BRAIN STEM W/O & W/DYE: CPT | Performed by: RADIOLOGY

## 2024-03-16 PROCEDURE — 70553 MRI BRAIN STEM W/O & W/DYE: CPT

## 2024-03-16 PROCEDURE — A9575 INJ GADOTERATE MEGLUMI 0.1ML: HCPCS | Performed by: STUDENT IN AN ORGANIZED HEALTH CARE EDUCATION/TRAINING PROGRAM

## 2024-03-16 PROCEDURE — 2550000001 HC RX 255 CONTRASTS: Performed by: STUDENT IN AN ORGANIZED HEALTH CARE EDUCATION/TRAINING PROGRAM

## 2024-03-16 RX ORDER — GADOTERATE MEGLUMINE 376.9 MG/ML
11 INJECTION INTRAVENOUS
Status: COMPLETED | OUTPATIENT
Start: 2024-03-16 | End: 2024-03-16

## 2024-03-16 RX ADMIN — GADOTERATE MEGLUMINE 11 ML: 376.9 INJECTION INTRAVENOUS at 09:45

## 2024-03-19 ENCOUNTER — HOSPITAL ENCOUNTER (OUTPATIENT)
Dept: RADIOLOGY | Facility: CLINIC | Age: 69
Discharge: HOME | End: 2024-03-19
Payer: COMMERCIAL

## 2024-03-19 DIAGNOSIS — M81.0 OSTEOPOROSIS, UNSPECIFIED OSTEOPOROSIS TYPE, UNSPECIFIED PATHOLOGICAL FRACTURE PRESENCE: ICD-10-CM

## 2024-04-02 ENCOUNTER — APPOINTMENT (OUTPATIENT)
Dept: OTOLARYNGOLOGY | Facility: CLINIC | Age: 69
End: 2024-04-02
Payer: COMMERCIAL

## 2024-04-02 ENCOUNTER — APPOINTMENT (OUTPATIENT)
Dept: RADIOLOGY | Facility: CLINIC | Age: 69
End: 2024-04-02
Payer: COMMERCIAL

## 2024-04-22 ENCOUNTER — DOCUMENTATION (OUTPATIENT)
Dept: PHYSICAL THERAPY | Facility: CLINIC | Age: 69
End: 2024-04-22
Payer: COMMERCIAL

## 2024-04-22 NOTE — PROGRESS NOTES
Physical Therapy    Discharge Summary    Name: Annabel Bejarano  MRN: [id number]  : 1955  Date: 24    Discharge Summary: PT    Discharge Information: Date of discharge 24, Date of last visit 24, Date of evaluation 23, Number of attended visits 2, Referred by Dr. Barreto, and Referred for Back pain, hip pain       Rehab Discharge Reason: Other no further tx needed

## 2024-04-23 ENCOUNTER — APPOINTMENT (OUTPATIENT)
Dept: OTOLARYNGOLOGY | Facility: CLINIC | Age: 69
End: 2024-04-23
Payer: COMMERCIAL

## 2024-04-24 NOTE — RESULT ENCOUNTER NOTE
I reviewed the MRI, both the images and the report.  I send a [email or web address] message to the patient to relay the results of the message.

## 2024-05-03 DIAGNOSIS — I10 ESSENTIAL HYPERTENSION: Primary | ICD-10-CM

## 2024-05-03 RX ORDER — LISINOPRIL 5 MG/1
5 TABLET ORAL DAILY
Qty: 90 TABLET | Refills: 3 | Status: SHIPPED | OUTPATIENT
Start: 2024-05-03

## 2024-05-14 DIAGNOSIS — E78.9 LIPID DISORDER: ICD-10-CM

## 2024-05-14 RX ORDER — ATORVASTATIN CALCIUM 20 MG/1
20 TABLET, FILM COATED ORAL DAILY
Qty: 90 TABLET | Refills: 3 | Status: SHIPPED | OUTPATIENT
Start: 2024-05-14

## 2024-05-28 ENCOUNTER — TELEPHONE (OUTPATIENT)
Dept: PEDIATRICS | Facility: CLINIC | Age: 69
End: 2024-05-28
Payer: COMMERCIAL

## 2024-05-28 DIAGNOSIS — L73.9 FOLLICULITIS: Primary | ICD-10-CM

## 2024-05-28 RX ORDER — DOXYCYCLINE HYCLATE 100 MG
100 TABLET ORAL 2 TIMES DAILY
Qty: 20 TABLET | Refills: 1 | Status: SHIPPED | OUTPATIENT
Start: 2024-05-28

## 2024-07-14 DIAGNOSIS — L30.8 OTHER ECZEMA: ICD-10-CM

## 2024-07-15 RX ORDER — TRIAMCINOLONE ACETONIDE 1 MG/G
CREAM TOPICAL
Qty: 30 G | Refills: 3 | Status: SHIPPED | OUTPATIENT
Start: 2024-07-15

## 2024-08-27 ENCOUNTER — APPOINTMENT (OUTPATIENT)
Dept: OPHTHALMOLOGY | Facility: CLINIC | Age: 69
End: 2024-08-27
Payer: COMMERCIAL

## 2024-09-30 ENCOUNTER — APPOINTMENT (OUTPATIENT)
Dept: PRIMARY CARE | Facility: CLINIC | Age: 69
End: 2024-09-30
Payer: COMMERCIAL

## 2024-09-30 VITALS
BODY MASS INDEX: 21.97 KG/M2 | OXYGEN SATURATION: 98 % | WEIGHT: 124 LBS | HEIGHT: 63 IN | SYSTOLIC BLOOD PRESSURE: 116 MMHG | DIASTOLIC BLOOD PRESSURE: 62 MMHG | HEART RATE: 68 BPM

## 2024-09-30 DIAGNOSIS — I10 ESSENTIAL HYPERTENSION: Primary | ICD-10-CM

## 2024-09-30 DIAGNOSIS — E78.9 LIPID DISORDER: ICD-10-CM

## 2024-09-30 DIAGNOSIS — E03.9 HYPOTHYROIDISM, UNSPECIFIED TYPE: ICD-10-CM

## 2024-09-30 DIAGNOSIS — Z78.0 POSTMENOPAUSAL: ICD-10-CM

## 2024-09-30 DIAGNOSIS — D70.9 NEUTROPENIA, UNSPECIFIED TYPE (CMS-HCC): ICD-10-CM

## 2024-09-30 DIAGNOSIS — Z00.00 WELLNESS EXAMINATION: ICD-10-CM

## 2024-09-30 PROCEDURE — 1036F TOBACCO NON-USER: CPT | Performed by: INTERNAL MEDICINE

## 2024-09-30 PROCEDURE — 99397 PER PM REEVAL EST PAT 65+ YR: CPT | Performed by: INTERNAL MEDICINE

## 2024-09-30 PROCEDURE — 1124F ACP DISCUSS-NO DSCNMKR DOCD: CPT | Performed by: INTERNAL MEDICINE

## 2024-09-30 PROCEDURE — 3008F BODY MASS INDEX DOCD: CPT | Performed by: INTERNAL MEDICINE

## 2024-09-30 PROCEDURE — 1159F MED LIST DOCD IN RCRD: CPT | Performed by: INTERNAL MEDICINE

## 2024-09-30 PROCEDURE — 3074F SYST BP LT 130 MM HG: CPT | Performed by: INTERNAL MEDICINE

## 2024-09-30 PROCEDURE — 3078F DIAST BP <80 MM HG: CPT | Performed by: INTERNAL MEDICINE

## 2024-09-30 ASSESSMENT — ENCOUNTER SYMPTOMS
COLOR CHANGE: 0
UNEXPECTED WEIGHT CHANGE: 0
ADENOPATHY: 0
SEIZURES: 0
NERVOUS/ANXIOUS: 0
LIGHT-HEADEDNESS: 0
DIZZINESS: 0
TREMORS: 0
MYALGIAS: 0
NAUSEA: 0
BLOOD IN STOOL: 0
DIARRHEA: 0
ABDOMINAL DISTENTION: 0
ANAL BLEEDING: 0
FEVER: 0
BRUISES/BLEEDS EASILY: 0
CHILLS: 0
ARTHRALGIAS: 0
CHOKING: 0
WHEEZING: 0
HEADACHES: 0
VOMITING: 0
DIAPHORESIS: 0
EYES NEGATIVE: 1
DYSPHORIC MOOD: 0
FATIGUE: 0
PALPITATIONS: 0
ABDOMINAL PAIN: 0
BACK PAIN: 0
COUGH: 0
RECTAL PAIN: 0
APNEA: 0
CONSTIPATION: 0
CHEST TIGHTNESS: 0
NUMBNESS: 0
CONFUSION: 0
SHORTNESS OF BREATH: 0

## 2024-09-30 NOTE — PROGRESS NOTES
"Subjective   Patient ID: Annabel Bejarano is a 69 y.o. female who presents for Annual Exam (Pt is due for colonoscopy ).    HPI wellness    Review of Systems   Constitutional:  Negative for chills, diaphoresis, fatigue, fever and unexpected weight change.   HENT: Negative.     Eyes: Negative.    Respiratory:  Negative for apnea, cough, choking, chest tightness, shortness of breath and wheezing.    Cardiovascular:  Negative for chest pain, palpitations and leg swelling.   Gastrointestinal:  Negative for abdominal distention, abdominal pain, anal bleeding, blood in stool, constipation, diarrhea, nausea, rectal pain and vomiting.   Endocrine: Negative for cold intolerance and heat intolerance.   Genitourinary: Negative.    Musculoskeletal:  Negative for arthralgias, back pain and myalgias.   Skin:  Positive for rash. Negative for color change and pallor.   Allergic/Immunologic: Positive for environmental allergies. Negative for food allergies.   Neurological:  Negative for dizziness, tremors, seizures, syncope, light-headedness, numbness and headaches.   Hematological:  Negative for adenopathy. Does not bruise/bleed easily.   Psychiatric/Behavioral:  Negative for confusion and dysphoric mood. The patient is not nervous/anxious.    Eczema      Objective   /62   Pulse 68   Ht 1.6 m (5' 3\")   Wt 56.2 kg (124 lb)   SpO2 98%   BMI 21.97 kg/m²     Physical Exam  Gen nad, affect wnl  Heentt eomfg, face symmetric, ncat  Neck w/o la, tm, bruit  Lungs clear   Cv rrr nl s1, s2; 2/6 sys m rusb  Ext w/o edema  Neuro grossly nonfocal  Skin good color  Abd soft, nt, w/o hsm    Assessment/Plan   Diagnoses and all orders for this visit:  Essential hypertension  Neutropenia, unspecified type (CMS-HCC)  -     Vitamin D 25-Hydroxy,Total (for eval of Vitamin D levels); Future  -     CBC and Auto Differential; Future  Wellness examination  -     Vitamin D 25-Hydroxy,Total (for eval of Vitamin D levels); Future  -     CBC and " Auto Differential; Future  -     Comprehensive metabolic panel; Future  -     Lipid Panel; Future  -     Tsh With Reflex To Free T4 If Abnormal; Future  -     Urinalysis with Reflex Microscopic; Future  Postmenopausal  -     Vitamin D 25-Hydroxy,Total (for eval of Vitamin D levels); Future  hyperlipidemia   Htn    Labs  Fu cardio, gi  Rec colonoscopy as directed  12 mo fu if all ok

## 2024-10-01 ENCOUNTER — LAB (OUTPATIENT)
Dept: LAB | Facility: LAB | Age: 69
End: 2024-10-01
Payer: COMMERCIAL

## 2024-10-01 ENCOUNTER — TELEPHONE (OUTPATIENT)
Dept: GASTROENTEROLOGY | Facility: HOSPITAL | Age: 69
End: 2024-10-01

## 2024-10-01 ENCOUNTER — TELEPHONE (OUTPATIENT)
Dept: OBSTETRICS AND GYNECOLOGY | Facility: CLINIC | Age: 69
End: 2024-10-01

## 2024-10-01 DIAGNOSIS — E78.9 LIPID DISORDER: ICD-10-CM

## 2024-10-01 DIAGNOSIS — E03.9 HYPOTHYROIDISM, UNSPECIFIED TYPE: Primary | ICD-10-CM

## 2024-10-01 DIAGNOSIS — Z00.00 WELLNESS EXAMINATION: ICD-10-CM

## 2024-10-01 DIAGNOSIS — I10 ESSENTIAL HYPERTENSION: ICD-10-CM

## 2024-10-01 DIAGNOSIS — Z78.0 POSTMENOPAUSAL: ICD-10-CM

## 2024-10-01 DIAGNOSIS — I77.810 ASCENDING AORTA DILATATION (CMS-HCC): ICD-10-CM

## 2024-10-01 DIAGNOSIS — E03.9 HYPOTHYROIDISM, UNSPECIFIED TYPE: ICD-10-CM

## 2024-10-01 DIAGNOSIS — D70.9 NEUTROPENIA, UNSPECIFIED TYPE (CMS-HCC): ICD-10-CM

## 2024-10-01 LAB
25(OH)D3 SERPL-MCNC: 41 NG/ML (ref 30–100)
ALBUMIN SERPL BCP-MCNC: 4.1 G/DL (ref 3.4–5)
ALP SERPL-CCNC: 64 U/L (ref 33–136)
ALT SERPL W P-5'-P-CCNC: 10 U/L (ref 7–45)
ANION GAP SERPL CALC-SCNC: 10 MMOL/L (ref 10–20)
APPEARANCE UR: CLEAR
AST SERPL W P-5'-P-CCNC: 28 U/L (ref 9–39)
BASOPHILS # BLD AUTO: 0.04 X10*3/UL (ref 0–0.1)
BASOPHILS NFR BLD AUTO: 1 %
BILIRUB SERPL-MCNC: 0.6 MG/DL (ref 0–1.2)
BILIRUB UR STRIP.AUTO-MCNC: NEGATIVE MG/DL
BUN SERPL-MCNC: 12 MG/DL (ref 6–23)
CALCIUM SERPL-MCNC: 9.5 MG/DL (ref 8.6–10.6)
CHLORIDE SERPL-SCNC: 105 MMOL/L (ref 98–107)
CHOLEST SERPL-MCNC: 147 MG/DL (ref 0–199)
CHOLESTEROL/HDL RATIO: 2.3
CO2 SERPL-SCNC: 32 MMOL/L (ref 21–32)
COLOR UR: COLORLESS
CREAT SERPL-MCNC: 0.64 MG/DL (ref 0.5–1.05)
EGFRCR SERPLBLD CKD-EPI 2021: >90 ML/MIN/1.73M*2
EOSINOPHIL # BLD AUTO: 0.35 X10*3/UL (ref 0–0.7)
EOSINOPHIL NFR BLD AUTO: 8.8 %
ERYTHROCYTE [DISTWIDTH] IN BLOOD BY AUTOMATED COUNT: 12.6 % (ref 11.5–14.5)
GLUCOSE SERPL-MCNC: 90 MG/DL (ref 74–99)
GLUCOSE UR STRIP.AUTO-MCNC: NORMAL MG/DL
HCT VFR BLD AUTO: 41.1 % (ref 36–46)
HDLC SERPL-MCNC: 63.6 MG/DL
HGB BLD-MCNC: 13.8 G/DL (ref 12–16)
IMM GRANULOCYTES # BLD AUTO: 0 X10*3/UL (ref 0–0.7)
IMM GRANULOCYTES NFR BLD AUTO: 0 % (ref 0–0.9)
KETONES UR STRIP.AUTO-MCNC: NEGATIVE MG/DL
LDLC SERPL CALC-MCNC: 69 MG/DL
LEUKOCYTE ESTERASE UR QL STRIP.AUTO: ABNORMAL
LYMPHOCYTES # BLD AUTO: 1.45 X10*3/UL (ref 1.2–4.8)
LYMPHOCYTES NFR BLD AUTO: 36.3 %
MCH RBC QN AUTO: 32 PG (ref 26–34)
MCHC RBC AUTO-ENTMCNC: 33.6 G/DL (ref 32–36)
MCV RBC AUTO: 95 FL (ref 80–100)
MONOCYTES # BLD AUTO: 0.45 X10*3/UL (ref 0.1–1)
MONOCYTES NFR BLD AUTO: 11.3 %
NEUTROPHILS # BLD AUTO: 1.7 X10*3/UL (ref 1.2–7.7)
NEUTROPHILS NFR BLD AUTO: 42.6 %
NITRITE UR QL STRIP.AUTO: NEGATIVE
NON HDL CHOLESTEROL: 83 MG/DL (ref 0–149)
NRBC BLD-RTO: 0 /100 WBCS (ref 0–0)
PH UR STRIP.AUTO: 7 [PH]
PLATELET # BLD AUTO: 219 X10*3/UL (ref 150–450)
POTASSIUM SERPL-SCNC: 4.3 MMOL/L (ref 3.5–5.3)
PROT SERPL-MCNC: 6.7 G/DL (ref 6.4–8.2)
PROT UR STRIP.AUTO-MCNC: NEGATIVE MG/DL
RBC # BLD AUTO: 4.31 X10*6/UL (ref 4–5.2)
RBC # UR STRIP.AUTO: NEGATIVE /UL
RBC #/AREA URNS AUTO: NORMAL /HPF
SODIUM SERPL-SCNC: 143 MMOL/L (ref 136–145)
SP GR UR STRIP.AUTO: 1.01
T4 FREE SERPL-MCNC: 1.59 NG/DL (ref 0.78–1.48)
TRIGL SERPL-MCNC: 70 MG/DL (ref 0–149)
TSH SERPL-ACNC: 0.13 MIU/L (ref 0.44–3.98)
UROBILINOGEN UR STRIP.AUTO-MCNC: NORMAL MG/DL
VLDL: 14 MG/DL (ref 0–40)
WBC # BLD AUTO: 4 X10*3/UL (ref 4.4–11.3)
WBC #/AREA URNS AUTO: NORMAL /HPF

## 2024-10-01 PROCEDURE — 85025 COMPLETE CBC W/AUTO DIFF WBC: CPT

## 2024-10-01 PROCEDURE — 36415 COLL VENOUS BLD VENIPUNCTURE: CPT

## 2024-10-01 PROCEDURE — 80053 COMPREHEN METABOLIC PANEL: CPT

## 2024-10-01 PROCEDURE — 82306 VITAMIN D 25 HYDROXY: CPT

## 2024-10-01 PROCEDURE — 84443 ASSAY THYROID STIM HORMONE: CPT

## 2024-10-01 PROCEDURE — 84439 ASSAY OF FREE THYROXINE: CPT

## 2024-10-01 PROCEDURE — 81001 URINALYSIS AUTO W/SCOPE: CPT

## 2024-10-01 PROCEDURE — 80061 LIPID PANEL: CPT

## 2024-10-01 RX ORDER — LEVOTHYROXINE SODIUM 50 UG/1
50 TABLET ORAL DAILY
Qty: 30 TABLET | Refills: 11 | Status: SHIPPED | OUTPATIENT
Start: 2024-10-01 | End: 2024-10-01 | Stop reason: SDUPTHER

## 2024-10-01 NOTE — TELEPHONE ENCOUNTER
Patient believes that she is due for her Prolia. Would like to know if we can set this up please.

## 2024-10-02 RX ORDER — ATORVASTATIN CALCIUM 20 MG/1
20 TABLET, FILM COATED ORAL DAILY
Qty: 90 TABLET | Refills: 3 | Status: SHIPPED | OUTPATIENT
Start: 2024-10-02

## 2024-10-02 RX ORDER — LISINOPRIL 5 MG/1
5 TABLET ORAL DAILY
Qty: 90 TABLET | Refills: 3 | Status: SHIPPED | OUTPATIENT
Start: 2024-10-02

## 2024-10-02 RX ORDER — CARVEDILOL 6.25 MG/1
6.25 TABLET ORAL
Qty: 180 TABLET | Refills: 3 | Status: SHIPPED | OUTPATIENT
Start: 2024-10-02 | End: 2025-10-02

## 2024-10-02 RX ORDER — LEVOTHYROXINE SODIUM 50 UG/1
50 TABLET ORAL DAILY
Qty: 30 TABLET | Refills: 11 | Status: SHIPPED | OUTPATIENT
Start: 2024-10-02 | End: 2025-10-02

## 2024-10-11 ENCOUNTER — SPECIALTY PHARMACY (OUTPATIENT)
Dept: PHARMACY | Facility: CLINIC | Age: 69
End: 2024-10-11

## 2024-10-11 NOTE — TELEPHONE ENCOUNTER
Spoke with  Specialty Pharmacy. Patient needs a PA.  Specialty will work on PA. Please check in next week Tuesday and Wednesday. [phone].

## 2024-11-05 NOTE — TELEPHONE ENCOUNTER
Spoke with pharmacy, patient is waiting until the first of the year for injection as her copay card has been exhausted.

## 2024-11-08 ENCOUNTER — APPOINTMENT (OUTPATIENT)
Dept: OPHTHALMOLOGY | Facility: CLINIC | Age: 69
End: 2024-11-08
Payer: COMMERCIAL

## 2024-12-10 ENCOUNTER — HOSPITAL ENCOUNTER (OUTPATIENT)
Dept: RADIOLOGY | Facility: CLINIC | Age: 69
Discharge: HOME | End: 2024-12-10
Payer: COMMERCIAL

## 2024-12-10 DIAGNOSIS — Z12.31 ENCOUNTER FOR SCREENING MAMMOGRAM FOR MALIGNANT NEOPLASM OF BREAST: ICD-10-CM

## 2024-12-10 PROCEDURE — 77063 BREAST TOMOSYNTHESIS BI: CPT | Performed by: RADIOLOGY

## 2024-12-10 PROCEDURE — 77067 SCR MAMMO BI INCL CAD: CPT | Performed by: RADIOLOGY

## 2024-12-10 PROCEDURE — 77067 SCR MAMMO BI INCL CAD: CPT

## 2024-12-17 ENCOUNTER — HOSPITAL ENCOUNTER (OUTPATIENT)
Dept: RADIOLOGY | Facility: CLINIC | Age: 69
Discharge: HOME | End: 2024-12-17
Payer: COMMERCIAL

## 2024-12-17 PROCEDURE — 77080 DXA BONE DENSITY AXIAL: CPT | Performed by: STUDENT IN AN ORGANIZED HEALTH CARE EDUCATION/TRAINING PROGRAM

## 2024-12-17 PROCEDURE — 77080 DXA BONE DENSITY AXIAL: CPT

## 2025-01-06 DIAGNOSIS — M81.0 AGE-RELATED OSTEOPOROSIS WITHOUT CURRENT PATHOLOGICAL FRACTURE: ICD-10-CM

## 2025-01-07 RX ORDER — DENOSUMAB 60 MG/ML
60 INJECTION SUBCUTANEOUS
Qty: 1 ML | Refills: 1 | Status: SHIPPED | OUTPATIENT
Start: 2025-01-07 | End: 2026-01-06

## 2025-01-10 ENCOUNTER — APPOINTMENT (OUTPATIENT)
Dept: OPHTHALMOLOGY | Facility: CLINIC | Age: 70
End: 2025-01-10
Payer: COMMERCIAL

## 2025-01-10 DIAGNOSIS — H04.123 BILATERAL DRY EYES: ICD-10-CM

## 2025-01-10 DIAGNOSIS — H43.813 PVD (POSTERIOR VITREOUS DETACHMENT), BOTH EYES: ICD-10-CM

## 2025-01-10 DIAGNOSIS — H17.11 CENTRAL CORNEAL OPACITY OF RIGHT EYE: Primary | ICD-10-CM

## 2025-01-10 DIAGNOSIS — H26.491 PCO (POSTERIOR CAPSULAR OPACIFICATION), RIGHT: ICD-10-CM

## 2025-01-10 DIAGNOSIS — Z96.1 PSEUDOPHAKIA OF BOTH EYES: ICD-10-CM

## 2025-01-10 PROCEDURE — 99213 OFFICE O/P EST LOW 20 MIN: CPT | Performed by: OPHTHALMOLOGY

## 2025-01-10 ASSESSMENT — REFRACTION_MANIFEST
OD_ADD: +2.00
OS_CYLINDER: -0.75
OD_SPHERE: +2.50
OD_CYLINDER: -2.75
OD_SPHERE: +2.75
METHOD_AUTOREFRACTION: 1
OS_AXIS: 115
OS_AXIS: 115
OD_AXIS: 090
OS_CYLINDER: -0.25
OS_ADD: +2.00
OD_CYLINDER: -3.25
OD_AXIS: 090
OS_SPHERE: +0.50
OS_SPHERE: +0.50

## 2025-01-10 ASSESSMENT — CONF VISUAL FIELD
OD_SUPERIOR_NASAL_RESTRICTION: 0
OS_NORMAL: 1
OS_INFERIOR_NASAL_RESTRICTION: 0
OS_SUPERIOR_TEMPORAL_RESTRICTION: 0
OD_INFERIOR_NASAL_RESTRICTION: 0
OS_SUPERIOR_NASAL_RESTRICTION: 0
OD_NORMAL: 1
OD_SUPERIOR_TEMPORAL_RESTRICTION: 0
OD_INFERIOR_TEMPORAL_RESTRICTION: 0
OS_INFERIOR_TEMPORAL_RESTRICTION: 0

## 2025-01-10 ASSESSMENT — VISUAL ACUITY
OS_SC: 20/25
OS_SC+: -2
METHOD: SNELLEN - LINEAR
OD_SC: 20/20
OD_SC+: -2

## 2025-01-10 ASSESSMENT — TONOMETRY
OD_IOP_MMHG: 13
OS_IOP_MMHG: 13
IOP_METHOD: GOLDMANN APPLANATION

## 2025-01-10 ASSESSMENT — CUP TO DISC RATIO
OS_RATIO: 0.3
OD_RATIO: 0.4

## 2025-01-10 ASSESSMENT — EXTERNAL EXAM - RIGHT EYE: OD_EXAM: NORMAL

## 2025-01-10 ASSESSMENT — ENCOUNTER SYMPTOMS: EYES NEGATIVE: 1

## 2025-01-10 ASSESSMENT — SLIT LAMP EXAM - LIDS: COMMENTS: GOOD POSITION,MGD 2+

## 2025-01-10 ASSESSMENT — EXTERNAL EXAM - LEFT EYE: OS_EXAM: NORMAL

## 2025-01-10 ASSESSMENT — PACHYMETRY: OD_CT(UM): 465

## 2025-01-10 NOTE — PROGRESS NOTES
Assessment/Plan   Diagnoses and all orders for this visit:  Central corneal opacity of right eye  S/p CL related ulcer  Stable scar    Bilateral dry eyes  ATs    Pseudophakia of both eyes  PCO (posterior capsular opacification), right  Not symptomatic   Monitor    PVD (posterior vitreous detachment), both eyes  No tears, holes or RD    1 year for pentacam and DFE

## 2025-01-17 ENCOUNTER — LAB (OUTPATIENT)
Dept: LAB | Facility: LAB | Age: 70
End: 2025-01-17
Payer: COMMERCIAL

## 2025-01-17 DIAGNOSIS — E03.9 HYPOTHYROIDISM, UNSPECIFIED TYPE: ICD-10-CM

## 2025-01-17 LAB
T4 FREE SERPL-MCNC: 0.89 NG/DL (ref 0.78–1.48)
TSH SERPL-ACNC: 42.02 MIU/L (ref 0.44–3.98)

## 2025-01-17 PROCEDURE — 84439 ASSAY OF FREE THYROXINE: CPT

## 2025-01-17 PROCEDURE — 84443 ASSAY THYROID STIM HORMONE: CPT

## 2025-01-20 ENCOUNTER — SPECIALTY PHARMACY (OUTPATIENT)
Dept: PHARMACY | Facility: CLINIC | Age: 70
End: 2025-01-20

## 2025-01-20 DIAGNOSIS — E03.9 HYPOTHYROIDISM, UNSPECIFIED TYPE: ICD-10-CM

## 2025-01-20 RX ORDER — LEVOTHYROXINE SODIUM 50 UG/1
75 TABLET ORAL DAILY
Qty: 45 TABLET | Refills: 11 | Status: SHIPPED | OUTPATIENT
Start: 2025-01-20 | End: 2026-01-20

## 2025-01-23 DIAGNOSIS — M81.0 AGE-RELATED OSTEOPOROSIS WITHOUT CURRENT PATHOLOGICAL FRACTURE: ICD-10-CM

## 2025-01-23 RX ORDER — DENOSUMAB 60 MG/ML
60 INJECTION SUBCUTANEOUS
Qty: 1 ML | Refills: 1 | Status: SHIPPED | OUTPATIENT
Start: 2025-01-23 | End: 2026-01-22

## 2025-02-28 DIAGNOSIS — M81.0 OSTEOPOROSIS WITHOUT CURRENT PATHOLOGICAL FRACTURE, UNSPECIFIED OSTEOPOROSIS TYPE: ICD-10-CM

## 2025-03-13 ENCOUNTER — APPOINTMENT (OUTPATIENT)
Dept: OBSTETRICS AND GYNECOLOGY | Facility: CLINIC | Age: 70
End: 2025-03-13
Payer: COMMERCIAL

## 2025-03-17 ENCOUNTER — APPOINTMENT (OUTPATIENT)
Dept: CARDIOLOGY | Facility: CLINIC | Age: 70
End: 2025-03-17
Payer: COMMERCIAL

## 2025-03-19 LAB
CALCIUM SERPL-MCNC: 9.6 MG/DL (ref 8.6–10.4)
TSH SERPL-ACNC: 4.23 MIU/L (ref 0.4–4.5)

## 2025-03-21 ENCOUNTER — PROCEDURE VISIT (OUTPATIENT)
Dept: OBSTETRICS AND GYNECOLOGY | Facility: CLINIC | Age: 70
End: 2025-03-21
Payer: COMMERCIAL

## 2025-03-21 DIAGNOSIS — M81.0 OSTEOPOROSIS WITHOUT CURRENT PATHOLOGICAL FRACTURE, UNSPECIFIED OSTEOPOROSIS TYPE: ICD-10-CM

## 2025-03-21 PROCEDURE — 96372 THER/PROPH/DIAG INJ SC/IM: CPT | Performed by: OBSTETRICS & GYNECOLOGY

## 2025-03-21 NOTE — PROGRESS NOTES
Prolia provided by patient pharmacy stored by office 60 mg subcutaneous left upper arm [id number] exp 7/31/2027.  Next dose due 6 months.

## 2025-03-25 DIAGNOSIS — L73.9 FOLLICULITIS: Primary | ICD-10-CM

## 2025-03-25 DIAGNOSIS — R11.0 NAUSEA: ICD-10-CM

## 2025-03-25 RX ORDER — ONDANSETRON 4 MG/1
4 TABLET, ORALLY DISINTEGRATING ORAL EVERY 8 HOURS PRN
Qty: 20 TABLET | Refills: 2 | Status: SHIPPED | OUTPATIENT
Start: 2025-03-25 | End: 2025-03-25 | Stop reason: WASHOUT

## 2025-03-25 RX ORDER — DOXYCYCLINE HYCLATE 100 MG
100 TABLET ORAL 2 TIMES DAILY
Qty: 30 TABLET | Refills: 4 | Status: SHIPPED | OUTPATIENT
Start: 2025-03-25 | End: 2025-03-25 | Stop reason: WASHOUT

## 2025-03-26 ENCOUNTER — APPOINTMENT (OUTPATIENT)
Dept: OBSTETRICS AND GYNECOLOGY | Facility: CLINIC | Age: 70
End: 2025-03-26
Payer: COMMERCIAL

## 2025-04-16 NOTE — PROGRESS NOTES
Primary Care Physician: Shukri Barreto MD  Date of Visit: 04/17/2025  8:00 AM EDT  Location of visit: [facility] [address]   Last office visit: 3/14/2024    Chief Complaint:     Annual follow-up thoracic aortic ectasia    HPI/Summary  Annabel Bejarano is a 70 y.o. female who presents for followup cardiology evaluation.     The patient has been recognized with mild dilatation of the ascending aorta, which measured 4.2 cm on CTA. Also, mild aortic insufficiency. The last echo was in 2021.  The aortic valve was trileaflet with mild aortic insufficiency.  CTA chest on April 1, 2023 showed a stable mild aneurysmal dilatation of the thoracic aorta, and mildly effaced sinotubular junction. Mid ascending aorta measured 4.2 x 4.2 cm. Stable as compared to 2021.  Last year, we titrated carvedilol upward and planned a 2-year follow-up imaging, this time with cardiac MRI.    A lipid panel 6 months ago showed a cholesterol of 147, HDL 64, LDL 69, triglycerides 70.  Creatinine normal.    She continues on carvedilol now 6.25 mg twice daily, atorvastatin 20 mg daily, lisinopril 5 mg daily and other medications were reviewed and reconciled.    She remains asymptomatic.  She walks for exercise, also light weights.  She is planning to walk a half marathon in a few weeks, with family members.  She recently retired from her job as a pediatrician, going back to school to study mental health counseling.    Specialty Problems          Cardiology Problems    Thoracic aortic aneurysm, without rupture, unspecified    Abnormal EKG    Ascending aorta dilatation    Essential hypertension    Lipid disorder    Moderate aortic regurgitation    MVP (mitral valve prolapse)    Non-rheumatic mitral regurgitation      Social History[1]   RX Allergies[2]  Current Outpatient Medications   Medication Instructions    atorvastatin (LIPITOR) 20 mg, oral, Daily    carvedilol (COREG) 6.25 mg, oral, 2 times daily (morning and late afternoon)    cholecalciferol  "(VITAMIN D-3) 2,000 Units, Daily    denosumab (Prolia) 60 mg/mL syringe PHYSICIANS OFFICE TO INJECT 60MG (1 SYRINGE) SUBCUTANEOUSLY ONCE EVERY 6 MONTHS.    fexofenadine (Allegra) 180 mg tablet 1 tablet, Daily PRN    fluocinolone (Derma-Smoothe) 0.01 % external oil Apply to rash on scalp twice a day    fluticasone (Flonase) 50 mcg/actuation nasal spray 2 sprays, Daily    levothyroxine (SYNTHROID, LEVOXYL) 75 mcg, oral, Daily, Take on an empty stomach at the same time each day, either 30 to 60 minutes prior to breakfast    lisinopril 5 mg, oral, Daily    MULTIVITAMIN-FERROUS FUMARATE-FOLIC ACID 9 MG-200 MCG TABLET, HALF TABLET, (Centrum) 1 tablet, Daily    peg 400-propylene glycol 0.4-0.3 % drops Administer into affected eye(s).    triamcinolone (Kenalog) 0.1 % cream USE ON ECZEMA RASH 2 TIMES DAILY       ROS    Vital Signs:  Vitals:    04/17/25 0809   BP: 97/60   BP Location: Right arm   Patient Position: Sitting   BP Cuff Size: Adult   Pulse: 63   SpO2: 98%   Weight: 59.8 kg (131 lb 12.8 oz)   Height: 1.6 m (5' 3\")     Wt Readings from Last 2 Encounters:   04/17/25 59.8 kg (131 lb 12.8 oz)   09/30/24 56.2 kg (124 lb)     Body mass index is 23.35 kg/m².     Physical Exam:    Pleasant cooperative and not in any distress.  Clear lung fields.  No carotid bruits.  Heart sounds regular, with grade 2 systolic murmur across the aortic valve.  No diastolic murmur appreciated.  No gallop.  No edema.     Lab Review:  CBC:  Lab Results   Component Value Date    WBC 4.0 (L) 10/01/2024    HGB 13.8 10/01/2024    HCT 41.1 10/01/2024    MCV 95 10/01/2024     10/01/2024       CMP:  Recent Labs     10/01/24  0807   GLUCOSE 90      K 4.3      CO2 32   ANIONGAP 10   BUN 12   CREATININE 0.64   EGFR >90   ALBUMIN 4.1   ALKPHOS 64   PROT 6.7   ALT 10   AST 28   BILITOT 0.6         LIPID PANEL:  Lab Results   Component Value Date    CHOL 147 10/01/2024    HDL 63.6 10/01/2024    CHHDL 2.3 10/01/2024    VLDL 14 10/01/2024 " "   TRIG 70 10/01/2024    NHDL 83 10/01/2024       HEME/ENDO:  Lab Results   Component Value Date    TSH 4.23 03/19/2025         No results for input(s): \"BNP\" in the last 66421 hours.  Recent Cardiology Tests:    ECG:    Normal ECG    Results for orders placed in visit on 03/14/24    ECG 12 lead (Clinic Performed)    Narrative  Normal sinus rhythm  Possible Left atrial enlargement  Borderline ECG    Confirmed by Sai Linares ([id number]) on 3/15/2024 2:40:44 PM       Echo:  Echo Results:  No results found for this or any previous visit from the past 3650 days.       Cath:      Stress Test:  Stress Results:  No results found for this or any previous visit from the past 365 days.         Cardiac Imaging:        Assessment/Plan   Stable thoracic aortic aneurysm, on an appropriate medication regimen.  Blood pressures are well-controlled.  Lipids are at goal on a moderate intensity statin regimen.  We are pleased with her continued attention to exercise.  We have been performing imaging at 2-year intervals, and we elected to perform MRA chest this year rather than a CT scan, to avoid excess radiation.  Contact her after we review the study.  No other testing required.  She will follow-up with primary care, return to cardiology clinic at annual intervals.    Orders:  Orders Placed This Encounter   Procedures    MR angio chest w and wo IV contrast    Blood Urea Nitrogen    Creatinine    ECG 12 lead (Clinic Performed)      Followup Appts:  Future Appointments   Date Time Provider Department Center   5/21/2025  7:20 AM Ariella Lopes MD [id number] [facility]   10/1/2025  8:00 AM Shukri Barreto MD [id number] [facility]   1/20/2026  8:15 AM Krystle Thompson MD [id number] [facility]           ____________________________________________________________  Sai Linares MD    Senior Attending Physician  [facility]  [facility] Chair for Cardiovascular Excellence  Case " [facility] [employer]         [1]   Social History  Tobacco Use    Smoking status: Never    Smokeless tobacco: Never   Vaping Use    Vaping status: Never Used   Substance Use Topics    Alcohol use: Yes     Alcohol/week: 1.0 - 2.0 standard drink of alcohol     Types: 1 - 2 Glasses of wine per week    Drug use: Never   [2]   Allergies  Allergen Reactions    Cephalosporins Other    Kiwi Hives    Latex Other    Nut - Unspecified Hives     Pine Nuts    Penicillins Other

## 2025-04-17 ENCOUNTER — OFFICE VISIT (OUTPATIENT)
Dept: CARDIOLOGY | Facility: CLINIC | Age: 70
End: 2025-04-17
Payer: COMMERCIAL

## 2025-04-17 VITALS
SYSTOLIC BLOOD PRESSURE: 97 MMHG | DIASTOLIC BLOOD PRESSURE: 60 MMHG | HEIGHT: 63 IN | HEART RATE: 63 BPM | BODY MASS INDEX: 23.35 KG/M2 | OXYGEN SATURATION: 98 % | WEIGHT: 131.8 LBS

## 2025-04-17 DIAGNOSIS — I77.810 ASCENDING AORTA DILATATION: ICD-10-CM

## 2025-04-17 DIAGNOSIS — I71.21 ANEURYSM OF ASCENDING AORTA WITHOUT RUPTURE: ICD-10-CM

## 2025-04-17 DIAGNOSIS — I35.1 MILD AORTIC REGURGITATION: ICD-10-CM

## 2025-04-17 DIAGNOSIS — R94.31 ABNORMAL EKG: Primary | ICD-10-CM

## 2025-04-17 DIAGNOSIS — I10 ESSENTIAL HYPERTENSION: ICD-10-CM

## 2025-04-17 PROCEDURE — G2211 COMPLEX E/M VISIT ADD ON: HCPCS | Performed by: INTERNAL MEDICINE

## 2025-04-17 PROCEDURE — 1159F MED LIST DOCD IN RCRD: CPT | Performed by: INTERNAL MEDICINE

## 2025-04-17 PROCEDURE — 99214 OFFICE O/P EST MOD 30 MIN: CPT | Mod: 25 | Performed by: INTERNAL MEDICINE

## 2025-04-17 PROCEDURE — 99214 OFFICE O/P EST MOD 30 MIN: CPT | Performed by: INTERNAL MEDICINE

## 2025-04-17 PROCEDURE — 93010 ELECTROCARDIOGRAM REPORT: CPT | Performed by: INTERNAL MEDICINE

## 2025-04-17 PROCEDURE — 3008F BODY MASS INDEX DOCD: CPT | Performed by: INTERNAL MEDICINE

## 2025-04-17 PROCEDURE — 1036F TOBACCO NON-USER: CPT | Performed by: INTERNAL MEDICINE

## 2025-04-17 PROCEDURE — 3078F DIAST BP <80 MM HG: CPT | Performed by: INTERNAL MEDICINE

## 2025-04-17 PROCEDURE — 1126F AMNT PAIN NOTED NONE PRSNT: CPT | Performed by: INTERNAL MEDICINE

## 2025-04-17 PROCEDURE — 3074F SYST BP LT 130 MM HG: CPT | Performed by: INTERNAL MEDICINE

## 2025-04-17 PROCEDURE — 93005 ELECTROCARDIOGRAM TRACING: CPT | Performed by: INTERNAL MEDICINE

## 2025-04-17 ASSESSMENT — ENCOUNTER SYMPTOMS
LOSS OF SENSATION IN FEET: 0
OCCASIONAL FEELINGS OF UNSTEADINESS: 0
DEPRESSION: 0

## 2025-04-17 ASSESSMENT — PATIENT HEALTH QUESTIONNAIRE - PHQ9
SUM OF ALL RESPONSES TO PHQ9 QUESTIONS 1 AND 2: 0
2. FEELING DOWN, DEPRESSED OR HOPELESS: NOT AT ALL
1. LITTLE INTEREST OR PLEASURE IN DOING THINGS: NOT AT ALL

## 2025-04-17 ASSESSMENT — PAIN SCALES - GENERAL: PAINLEVEL_OUTOF10: 0-NO PAIN

## 2025-04-17 NOTE — Clinical Note
She is doing well, excellent control of blood pressure and hyperlipidemia.  We will continue to monitor thoracic aortic aneurysm every 2 years, this year we will schedule MR angiography of the chest.

## 2025-04-18 LAB
ATRIAL RATE: 63 BPM
P AXIS: 71 DEGREES
P OFFSET: 173 MS
P ONSET: 119 MS
PR INTERVAL: 202 MS
Q ONSET: 220 MS
QRS COUNT: 10 BEATS
QRS DURATION: 80 MS
QT INTERVAL: 434 MS
QTC CALCULATION(BAZETT): 444 MS
QTC FREDERICIA: 441 MS
R AXIS: 42 DEGREES
T AXIS: 65 DEGREES
T OFFSET: 437 MS
VENTRICULAR RATE: 63 BPM

## 2025-05-19 NOTE — ASSESSMENT & PLAN NOTE
Pap is not indicated based on age and history. She is more than 20 years past treatment for cervical dysplasia.   Mammogram is planned for December 2025.  DEXA is next due in 2026.  Regular exercise and attaining/maintaining a healthy weight is encouraged.   Adequate calcium intake with diet or supplements is encouraged.    We will notify of any abnormal results.

## 2025-05-19 NOTE — PROGRESS NOTES
Subjective   Patient ID: Annabel HARRIS is a 70 y.o. female who presents for Annual Exam.  She presents for annual exam.   Pap and HPV returned negative 10/25/2021. She is s/p LEEP cervical conization in 1999 and no further cervical cancer screening is indicated based on age and being more than 20 years past treatment of cervical dysplasia.   DEXA 9/25/2020 returned with osteoporosis with T score -2.5 in hip and -1.9 in spine.   12/17/2024 DEXA showed some improvement with T score -2.2 in spine, -2.3 in hip. She has been on Prolia.     She notes some vaginal irritation and dryness. After discussion she desires to try vaginal estrogen cream.         Review of Systems   Constitutional:  Negative for activity change.   HENT:  Negative for congestion.    Respiratory:  Negative for apnea and cough.    Cardiovascular:  Negative for chest pain.   Gastrointestinal:  Negative for constipation and diarrhea.   Genitourinary:  Negative for hematuria and vaginal pain.   Musculoskeletal:  Negative for joint swelling.   Neurological:  Negative for dizziness.   Psychiatric/Behavioral:  Negative for agitation.        Medical History[1]   Surgical History[2]   RX Allergies[3]   Medications Ordered Prior to Encounter[4]     Objective   Physical Exam  Constitutional:       Appearance: Normal appearance.   Neck:      Thyroid: No thyromegaly.   Cardiovascular:      Rate and Rhythm: Normal rate and regular rhythm.      Heart sounds: Normal heart sounds.   Pulmonary:      Effort: Pulmonary effort is normal.      Breath sounds: Normal breath sounds.   Chest:      Chest wall: No mass.   Breasts:     Right: Normal. No inverted nipple, mass, nipple discharge or skin change.      Left: Normal. No inverted nipple, mass, nipple discharge or skin change.   Abdominal:      General: There is no distension.      Palpations: Abdomen is soft. There is no mass.      Tenderness: There is no abdominal tenderness.   Genitourinary:     General: Normal vulva.       Exam position: Lithotomy position.      Labia:         Right: No rash.         Left: No rash.       Urethra: No urethral lesion.      Vagina: Normal. No lesions.      Cervix: No friability or lesion.      Uterus: Normal. Not enlarged and not tender.       Adnexa: Right adnexa normal and left adnexa normal.        Right: No mass or tenderness.          Left: No mass or tenderness.        Comments: Atrophy is noted.  Musculoskeletal:         General: No deformity.      Cervical back: Neck supple.   Lymphadenopathy:      Cervical: No cervical adenopathy.   Skin:     General: Skin is warm and dry.      Findings: No rash.   Neurological:      General: No focal deficit present.      Mental Status: She is alert.   Psychiatric:         Mood and Affect: Mood normal.         Behavior: Behavior is cooperative.         Thought Content: Thought content normal.           Problem List Items Addressed This Visit          Medium    Atrophic vaginitis    Overview   Estradiol cream is prescribed for atrophy.         Relevant Medications    estradiol (Estrace) 0.01 % (0.1 mg/gram) vaginal cream    Osteoporosis    Overview   DEXA 9/25/2020 returned with osteoporosis with T score -2.5 in hip and -1.9 in spine.   12/17/2024 DEXA showed some improvement with T score -2.2 in spine, -2.3 in hip.   She has been on Prolia.          Well woman exam with routine gynecological exam - Primary    Overview   Pap and HPV returned negative 10/25/2021. She is s/p LEEP cervical conization in 1999 and no further cervical cancer screening is indicated based on age and being more than 20 years past treatment of cervical dysplasia.   DEXA 9/25/2020 returned with osteoporosis with T score -2.5 in hip and -1.9 in spine. 12/17/2024 DEXA showed some improvement with T score -2.2 in spine, -2.3 in hip. She has been on Prolia.          Current Assessment & Plan   Pap is not indicated based on age and history. She is more than 20 years past treatment for  cervical dysplasia.   Mammogram is planned for December 2025.  DEXA is next due in 2026.  Regular exercise and attaining/maintaining a healthy weight is encouraged.   Adequate calcium intake with diet or supplements is encouraged.    We will notify of any abnormal results.           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    Chronic rhinitis     Rhinitis    Chronic rhinitis 07/13/2018    Rhinitis    Chronic rhinitis 01/05/2018    Rhinitis    Conjunctival hyperemia, right eye 10/02/2015    Conjunctival hyperemia of right eye    Disorder of the skin and subcutaneous tissue, 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    Skin lesion    Disorder of the skin and subcutaneous tissue, unspecified 01/05/2018    Skin lesion    Dry eye syndrome of left lacrimal gland 12/11/2014    Dry eye syndrome of left lacrimal gland    Dry eye syndrome of left lacrimal gland 11/26/2014    Dry eye syndrome of left lacrimal gland    Dry eye syndrome of left lacrimal gland 11/26/2014    Dry eye syndrome of left lacrimal gland    Dry eye syndrome of right lacrimal gland 12/11/2014    Dry eye syndrome of right lacrimal gland    Dry eye syndrome of right lacrimal gland 11/26/2014    Dry eye syndrome of right lacrimal gland    Dry eye syndrome of right lacrimal gland 11/26/2014    Dry eye syndrome of right lacrimal gland    Dry eye syndrome of unspecified lacrimal gland 01/05/2018    Dry eye syndrome    Encounter for screening for malignant neoplasm of rectum 07/13/2018    Screening for rectal cancer    Encounter for screening for malignant neoplasm of rectum 01/05/2018    Screening for rectal cancer    Encounter for screening mammogram for malignant neoplasm of breast 07/13/2018    Visit for  screening mammogram    Encounter for screening mammogram for malignant neoplasm of breast 01/05/2018    Visit for screening mammogram    Epithelial (juvenile) corneal dystrophy, 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    Epithelial (juvenile) corneal dystrophy, 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    Hypothyroidism, unspecified 09/21/2022    Hypothyroidism    Lichen sclerosus et atrophicus 07/13/2018    Lichen sclerosus et atrophicus    Lichen sclerosus et atrophicus 01/05/2018    Lichen sclerosus et atrophicus    Nonrheumatic aortic (valve) insufficiency 07/13/2018    Moderate aortic regurgitation    Nonrheumatic aortic (valve) insufficiency 01/05/2018    Moderate aortic regurgitation    Nonrheumatic mitral (valve) insufficiency 01/05/2018    Non-rheumatic mitral regurgitation    Nonrheumatic mitral (valve) insufficiency 07/13/2018    Non-rheumatic mitral regurgitation    Nonrheumatic mitral (valve) prolapse 07/13/2018    MVP (mitral valve prolapse)    Nonrheumatic mitral (valve) prolapse 01/05/2018    MVP (mitral valve prolapse)    Other age-related incipient cataract, left eye 10/02/2015    Other age-related incipient cataract of left eye    Other age-related incipient cataract, right eye 10/02/2015    Other age-related incipient cataract of right eye    Other conditions influencing health status     Aortic sclerosis    Other specified diseases of blood and blood-forming organs 07/13/2018    Macrocytosis    Other specified diseases of blood and blood-forming organs 01/05/2018    Macrocytosis    Other specified disorders of bone density and structure, unspecified site 07/13/2018    Osteopenia    Other specified disorders of bone density and structure, unspecified site 01/05/2018    Osteopenia    Perianal venous thrombosis 07/13/2018    Hemorrhoids, internal, thrombosed    Perianal venous thrombosis 01/05/2018    Hemorrhoids, internal, thrombosed    Personal  history of other diseases of the musculoskeletal system and connective tissue     History of osteopenia    Postmenopausal atrophic vaginitis 07/13/2018    Atrophic vaginitis    Postmenopausal atrophic vaginitis 01/05/2018    Atrophic vaginitis    Unspecified blepharitis unspecified eye, unspecified eyelid 01/05/2018    Blepharitis    Unspecified corneal ulcer, right eye 10/02/2015    Corneal ulcer of right eye    Vitreous degeneration, left eye 10/02/2015    Vitreous degeneration of left eye    Vitreous degeneration, left eye 10/02/2015    Vitreous degeneration of left eye   [2]   Past Surgical History:  Procedure Laterality Date    COLONOSCOPY  10/30/2015    Colonoscopy (Fiberoptic)    CORNEA LACERATION REPAIR Left     05/2023    FOOT SURGERY  05/30/2014    Foot Surgery    OTHER SURGICAL HISTORY  11/08/2022    Cataract surgery    OTHER SURGICAL HISTORY  07/24/2020    Cervical loop electrosurgical excision   [3]   Allergies  Allergen Reactions    Cephalosporins Other    Kiwi Hives    Latex Other    Nut - Unspecified Hives     Pine Nuts    Penicillins Other   [4]   Current Outpatient Medications on File Prior to Visit   Medication Sig Dispense Refill    atorvastatin (Lipitor) 20 mg tablet Take 1 tablet (20 mg) by mouth once daily. 90 tablet 3    carvedilol (Coreg) 6.25 mg tablet Take 1 tablet (6.25 mg) by mouth 2 times daily (morning and late afternoon). 180 tablet 3    cholecalciferol (Vitamin D-3) 50 MCG (2000 UT) tablet Take 1 tablet (2,000 Units) by mouth once daily.      denosumab (Prolia) 60 mg/mL syringe PHYSICIANS OFFICE TO INJECT 60MG (1 SYRINGE) SUBCUTANEOUSLY ONCE EVERY 6 MONTHS. 1 mL 1    fexofenadine (Allegra) 180 mg tablet Take 1 tablet (180 mg) by mouth once daily as needed.      fluocinolone (Derma-Smoothe) 0.01 % external oil Apply to rash on scalp twice a day      fluticasone (Flonase) 50 mcg/actuation nasal spray Administer 2 sprays into affected nostril(s) once daily.      levothyroxine  (Synthroid, Levoxyl) 50 mcg tablet Take 1.5 tablets (75 mcg) by mouth early in the morning.. Take on an empty stomach at the same time each day, either 30 to 60 minutes prior to breakfast 45 tablet 11    lisinopril 5 mg tablet Take 1 tablet (5 mg) by mouth once daily. 90 tablet 3    MULTIVITAMIN-FERROUS FUMARATE-FOLIC ACID 9 MG-200 MCG TABLET, HALF TABLET, (Centrum) Take 1 half tablet by mouth once daily.      peg 400-propylene glycol 0.4-0.3 % drops Administer into affected eye(s).      triamcinolone (Kenalog) 0.1 % cream USE ON ECZEMA RASH 2 TIMES DAILY 30 g 3     Current Facility-Administered Medications on File Prior to Visit   Medication Dose Route Frequency Provider Last Rate Last Admin    denosumab (Prolia) injection 60 mg  60 mg subcutaneous q6 months Ariella Lopes MD   60 mg at 03/21/25 1016

## 2025-05-21 ENCOUNTER — APPOINTMENT (OUTPATIENT)
Dept: OBSTETRICS AND GYNECOLOGY | Facility: CLINIC | Age: 70
End: 2025-05-21
Payer: COMMERCIAL

## 2025-05-21 VITALS
WEIGHT: 129 LBS | SYSTOLIC BLOOD PRESSURE: 122 MMHG | BODY MASS INDEX: 22.86 KG/M2 | HEIGHT: 63 IN | DIASTOLIC BLOOD PRESSURE: 78 MMHG

## 2025-05-21 DIAGNOSIS — M81.6 LOCALIZED OSTEOPOROSIS WITHOUT CURRENT PATHOLOGICAL FRACTURE: ICD-10-CM

## 2025-05-21 DIAGNOSIS — Z12.31 ENCOUNTER FOR SCREENING MAMMOGRAM FOR MALIGNANT NEOPLASM OF BREAST: ICD-10-CM

## 2025-05-21 DIAGNOSIS — Z01.419 WELL WOMAN EXAM WITH ROUTINE GYNECOLOGICAL EXAM: Primary | ICD-10-CM

## 2025-05-21 DIAGNOSIS — N95.2 ATROPHIC VAGINITIS: ICD-10-CM

## 2025-05-21 PROCEDURE — 1160F RVW MEDS BY RX/DR IN RCRD: CPT | Performed by: OBSTETRICS & GYNECOLOGY

## 2025-05-21 PROCEDURE — 3078F DIAST BP <80 MM HG: CPT | Performed by: OBSTETRICS & GYNECOLOGY

## 2025-05-21 PROCEDURE — 99397 PER PM REEVAL EST PAT 65+ YR: CPT | Performed by: OBSTETRICS & GYNECOLOGY

## 2025-05-21 PROCEDURE — 1036F TOBACCO NON-USER: CPT | Performed by: OBSTETRICS & GYNECOLOGY

## 2025-05-21 PROCEDURE — 1159F MED LIST DOCD IN RCRD: CPT | Performed by: OBSTETRICS & GYNECOLOGY

## 2025-05-21 PROCEDURE — 3074F SYST BP LT 130 MM HG: CPT | Performed by: OBSTETRICS & GYNECOLOGY

## 2025-05-21 PROCEDURE — 3008F BODY MASS INDEX DOCD: CPT | Performed by: OBSTETRICS & GYNECOLOGY

## 2025-05-21 RX ORDER — ESTRADIOL 0.1 MG/G
2 CREAM VAGINAL NIGHTLY
Qty: 42.5 G | Refills: 12 | Status: SHIPPED | OUTPATIENT
Start: 2025-05-21 | End: 2026-05-21

## 2025-05-21 SDOH — ECONOMIC STABILITY: TRANSPORTATION INSECURITY
IN THE PAST 12 MONTHS, HAS LACK OF TRANSPORTATION KEPT YOU FROM MEETINGS, WORK, OR FROM GETTING THINGS NEEDED FOR DAILY LIVING?: NO

## 2025-05-21 SDOH — ECONOMIC STABILITY: FOOD INSECURITY: WITHIN THE PAST 12 MONTHS, THE FOOD YOU BOUGHT JUST DIDN'T LAST AND YOU DIDN'T HAVE MONEY TO GET MORE.: NEVER TRUE

## 2025-05-21 SDOH — ECONOMIC STABILITY: TRANSPORTATION INSECURITY
IN THE PAST 12 MONTHS, HAS THE LACK OF TRANSPORTATION KEPT YOU FROM MEDICAL APPOINTMENTS OR FROM GETTING MEDICATIONS?: NO

## 2025-05-21 SDOH — ECONOMIC STABILITY: FOOD INSECURITY: WITHIN THE PAST 12 MONTHS, YOU WORRIED THAT YOUR FOOD WOULD RUN OUT BEFORE YOU GOT MONEY TO BUY MORE.: NEVER TRUE

## 2025-05-21 ASSESSMENT — ENCOUNTER SYMPTOMS
CONSTIPATION: 0
DIARRHEA: 0
COUGH: 0
DIZZINESS: 0
JOINT SWELLING: 0
ACTIVITY CHANGE: 0
HEMATURIA: 0
AGITATION: 0
APNEA: 0

## 2025-05-22 LAB
BUN SERPL-MCNC: 18 MG/DL (ref 7–25)
CREAT SERPL-MCNC: 0.7 MG/DL (ref 0.5–1.05)
EGFRCR SERPLBLD CKD-EPI 2021: 94 ML/MIN/1.73M2

## 2025-10-01 ENCOUNTER — APPOINTMENT (OUTPATIENT)
Dept: PRIMARY CARE | Facility: CLINIC | Age: 70
End: 2025-10-01
Payer: COMMERCIAL

## 2026-01-20 ENCOUNTER — APPOINTMENT (OUTPATIENT)
Dept: OPHTHALMOLOGY | Facility: CLINIC | Age: 71
End: 2026-01-20
Payer: COMMERCIAL